# Patient Record
Sex: FEMALE | HISPANIC OR LATINO | Employment: UNEMPLOYED | URBAN - METROPOLITAN AREA
[De-identification: names, ages, dates, MRNs, and addresses within clinical notes are randomized per-mention and may not be internally consistent; named-entity substitution may affect disease eponyms.]

---

## 2023-04-06 LAB — EXTERNAL HEMOGLOBIN: 10.9 G/DL

## 2023-05-19 ENCOUNTER — TELEPHONE (OUTPATIENT)
Dept: OBSTETRICS AND GYNECOLOGY | Facility: CLINIC | Age: 23
End: 2023-05-19
Payer: COMMERCIAL

## 2023-05-19 NOTE — TELEPHONE ENCOUNTER
New ob pt called to make an appointment. 32 wks transferring from [place]. She is going to fax her medical records so we can get her scheduled.

## 2023-05-22 NOTE — TELEPHONE ENCOUNTER
Pt brought physical copy of medical records from [place].  They are in your mailbox at [place] office.  Please review tomorrow at [place] and advise if you are able to accept pt's care.     Thanks,   Vandana Pacheco # [phone]

## 2023-05-25 NOTE — PROGRESS NOTES
Initial OB Visit     Chief Complaint   Patient presents with   • Initial Prenatal Visit        Marilu Neely is being seen today for her first obstetrical visit with our practice. She transferred, and records are in media.  She is a 23 y.o.  32w6d by ultrasound at outside office.    This is not a planned pregnancy. She feels safe where she lives. The father is not involved.    OB History    Para Term  AB Living   2       1     SAB IAB Ectopic Molar Multiple Live Births                    # Outcome Date GA Lbr Gonzalez/2nd Weight Sex Delivery Anes PTL Lv   2 Current            1 AB 2017               Current obstetric complaints: Reflux- Tums   Prior obstetric issues, potential pregnancy concerns: N/A  Family history of genetic issues (includes FOB): denies  Prior infections concerning in pregnancy (Rash, fever since LMP): denies  Varicella Hx: immune by history  Prior genetic testing: negative aneuploidy testing  History of abnormal pap smears: denies  History of STIs: denies  History of HSV in self or partner? denies  Prepregnancy weight 168lbs    Prenatal records reviewed  Labs:   1 hour glucose challenge test: 91  Hemoglobin 10.9, platelets 340 on 2023  March 3, 2023: Antibody negative, gonorrhea and chlamydia negative, urine culture negative, AST 18, ALT 47  : RPR negative, rubella immune, HIV negative, hepatitis B negative, O+  March 10, 2023: Normal hemoglobinopathy, negative SMA negative Fragile X.  Maternity 23 also negative this pregnancy.  Anatomy within normal limits, breech presentation    Past Medical History:   Diagnosis Date   • Cataract    • Pabon-Herminio syndrome        Past Surgical History:   Procedure Laterality Date   • EYE SURGERY           Current Outpatient Medications:   •  prenatal vitamin (prenatal, CLASSIC, vitamin) tablet, Take  by mouth Daily., Disp: , Rfl:     No Known Allergies    Social History     Socioeconomic History   • Marital  "status: Single   Tobacco Use   • Smoking status: Never   Vaping Use   • Vaping Use: Never used   Substance and Sexual Activity   • Alcohol use: Never   • Drug use: Never       Family History   Problem Relation Age of Onset   • No Known Problems Mother    • No Known Problems Father        Review of systems   See HPI         Objective    BP 97/64   Ht 160 cm (62.99\")   Wt 83.5 kg (184 lb)   LMP 10/08/2022   BMI 32.60 kg/m²       General Appearance:    Alert, cooperative, in no acute distress, habitus normal   Head:    Normocephalic, without obvious abnormality, atraumatic   Eyes:            Lids and lashes normal, conjunctivae and sclerae normal, no   icterus, no pallor, corneas clear   Ears:    Ears appear intact with no abnormalities noted       Neck:   No adenopathy, supple, trachea midline, no thyromegaly   Back:     No kyphosis present, no scoliosis present,                       Lungs:     Clear to auscultation,respirations regular, even and                   unlabored    Heart:    Regular rhythm and normal rate, normal S1 and S2, no            murmur, no gallop, no rub, no click   Breast Exam:    No masses, No nipple discharge   Abdomen:     Normal bowel sounds, no masses, no organomegaly, soft        non-tender, non-distended, no guarding, no rebound                 tenderness   Genitalia:    Vulva - BUS-WNL, NEFG    Vagina - No discharge, No bleeding    Cervix - No Lesions, closed     Uterus - Consistent with 32 weeks    Adnexa - No masses, NT    Pelvimetry - clinically adequate, gynecoid pelvis     Extremities:   Moves all extremities well, no edema, no cyanosis, no              redness   Pulses:   Pulses palpable and equal bilaterally   Skin:   No bleeding, bruising or rash   Lymph nodes:   No palpable adenopathy   Neurologic:   Sensation intact, A&O times 3      Assessment  Pregnancy at 32w6d    Diagnosis Plan   1. Supervision of normal first pregnancy, antepartum  US Ob Follow Up Transabdominal " Approach      2. Maternal care for breech presentation, single gestation        3. Elevated liver enzymes, resolved        4. Pabon-Herminio syndrome      Needs Anesthesia consult           Plan    Initial labs reviewed - see above  Patient is on Prenatal vitamins  Problem list reviewed and updated.  Counseled on limitations of ultrasound in pregnancy in detecting aneuploidy/fetal anomalies  She needs an anesthesia consultation.  Reviewed with patient the location of labor and delivery and how to go about receiving a consultation  Reports normal glucose testing at last office  All questions answered.   Return in about 2 weeks (around 6/9/2023) for growth US, OB visit.      Claire Chong MD

## 2023-05-26 ENCOUNTER — INITIAL PRENATAL (OUTPATIENT)
Dept: OBSTETRICS AND GYNECOLOGY | Facility: CLINIC | Age: 23
End: 2023-05-26
Payer: COMMERCIAL

## 2023-05-26 VITALS
WEIGHT: 184 LBS | BODY MASS INDEX: 32.6 KG/M2 | DIASTOLIC BLOOD PRESSURE: 64 MMHG | HEIGHT: 63 IN | SYSTOLIC BLOOD PRESSURE: 97 MMHG

## 2023-05-26 DIAGNOSIS — R74.8 ELEVATED LIVER ENZYMES: ICD-10-CM

## 2023-05-26 DIAGNOSIS — Z34.00 SUPERVISION OF NORMAL FIRST PREGNANCY, ANTEPARTUM: Primary | ICD-10-CM

## 2023-05-26 DIAGNOSIS — Q87.0 FREEMAN-SHELDON SYNDROME: ICD-10-CM

## 2023-05-26 LAB
GLUCOSE UR STRIP-MCNC: NEGATIVE MG/DL
PROT UR STRIP-MCNC: ABNORMAL MG/DL

## 2023-05-26 RX ORDER — PRENATAL VIT NO.126/IRON/FOLIC 28MG-0.8MG
TABLET ORAL DAILY
COMMUNITY

## 2023-06-13 ENCOUNTER — ROUTINE PRENATAL (OUTPATIENT)
Dept: OBSTETRICS AND GYNECOLOGY | Facility: CLINIC | Age: 23
End: 2023-06-13
Payer: COMMERCIAL

## 2023-06-13 VITALS — DIASTOLIC BLOOD PRESSURE: 64 MMHG | BODY MASS INDEX: 33.67 KG/M2 | SYSTOLIC BLOOD PRESSURE: 120 MMHG | WEIGHT: 190 LBS

## 2023-06-13 DIAGNOSIS — Z3A.35 35 WEEKS GESTATION OF PREGNANCY: Primary | ICD-10-CM

## 2023-06-13 DIAGNOSIS — Q87.0 FREEMAN-SHELDON SYNDROME: ICD-10-CM

## 2023-06-13 DIAGNOSIS — Z98.890 H/O EYE SURGERY: ICD-10-CM

## 2023-06-13 LAB
GLUCOSE UR STRIP-MCNC: NEGATIVE MG/DL
PROT UR STRIP-MCNC: ABNORMAL MG/DL

## 2023-06-13 RX ORDER — PRENATAL VIT,CAL 76/IRON/FOLIC 29 MG-1 MG
1 TABLET ORAL EVERY MORNING
Status: ON HOLD | COMMUNITY
Start: 2023-02-28

## 2023-06-13 NOTE — PROGRESS NOTES
CC: RG     Marilucourt Neely is a 23 y.o.  at 35w3d  here for routine OB visit  Reports that she is concerned if she needs a CS for the risk of malignant hyperthermia. She has never had this before but she has Pabon-Herminio Syndrome. Reports she also had lenses replaced in [place] and is unsure if that will preclude her from a delivery type.    She denies ever being told she had to have a CS  She has not yet met with anesthesia    /64   Wt 86.2 kg (190 lb)   LMP 10/08/2022   BMI 33.67 kg/m²    Gen: NAD, well appearing  Abd: nontender  Pelvic: normal external genitalia    See OB Flowsheet    ASSESSMENT/PLAN:  (Z3A.35) 35 weeks gestation of pregnancy - Plan: POC Urinalysis Dipstick    (Q87.0) Pabon-Herminio syndrome    (Z98.890) H/O eye surgery    Reviewed with patient that I do not see an indication for a CS unless US or fetal tracing or maternal factors arise at this time, but did strongly encourage her to see Anesthesia. Provided map of hospital and location. She will go for consult  US within normal limits, BPP   Routine counseling pertinent to this stage of pregnancy was reviewed including labor precautions  Return in about 1 week (around 2023).

## 2023-06-15 LAB — GP B STREP DNA SPEC QL NAA+PROBE: NEGATIVE

## 2023-06-18 ENCOUNTER — ANESTHESIA (OUTPATIENT)
Dept: LABOR AND DELIVERY | Facility: HOSPITAL | Age: 23
End: 2023-06-18
Payer: COMMERCIAL

## 2023-06-18 ENCOUNTER — ANESTHESIA EVENT (OUTPATIENT)
Dept: LABOR AND DELIVERY | Facility: HOSPITAL | Age: 23
End: 2023-06-18
Payer: COMMERCIAL

## 2023-06-18 PROBLEM — Z98.891 S/P CESAREAN SECTION: Status: ACTIVE | Noted: 2023-06-18

## 2023-06-18 PROBLEM — Z34.90 PREGNANT: Status: ACTIVE | Noted: 2023-06-18

## 2023-06-18 PROCEDURE — 25010000002 AZITHROMYCIN PER 500 MG: Performed by: OBSTETRICS & GYNECOLOGY

## 2023-06-18 PROCEDURE — 25010000002 MORPHINE PER 10 MG: Performed by: ANESTHESIOLOGY

## 2023-06-18 PROCEDURE — 25010000002 KETOROLAC TROMETHAMINE PER 15 MG: Performed by: OBSTETRICS & GYNECOLOGY

## 2023-06-18 PROCEDURE — 25010000002 PHENYLEPHRINE 10 MG/ML SOLUTION: Performed by: NURSE ANESTHETIST, CERTIFIED REGISTERED

## 2023-06-18 RX ORDER — MORPHINE SULFATE 4 MG/ML
INJECTION, SOLUTION INTRAMUSCULAR; INTRAVENOUS
Status: COMPLETED | OUTPATIENT
Start: 2023-06-18 | End: 2023-06-18

## 2023-06-18 RX ORDER — PHENYLEPHRINE HYDROCHLORIDE 10 MG/ML
INJECTION INTRAVENOUS AS NEEDED
Status: DISCONTINUED | OUTPATIENT
Start: 2023-06-18 | End: 2023-06-19 | Stop reason: SURG

## 2023-06-18 RX ORDER — BUPIVACAINE HYDROCHLORIDE 7.5 MG/ML
INJECTION, SOLUTION EPIDURAL; RETROBULBAR
Status: COMPLETED | OUTPATIENT
Start: 2023-06-18 | End: 2023-06-18

## 2023-06-18 RX ADMIN — PHENYLEPHRINE HYDROCHLORIDE 100 MCG: 10 INJECTION INTRAVENOUS at 23:19

## 2023-06-18 RX ADMIN — PHENYLEPHRINE HYDROCHLORIDE 100 MCG: 10 INJECTION INTRAVENOUS at 23:30

## 2023-06-18 RX ADMIN — PHENYLEPHRINE HYDROCHLORIDE 100 MCG: 10 INJECTION INTRAVENOUS at 23:28

## 2023-06-18 RX ADMIN — MORPHINE SULFATE 200 MCG: 4 INJECTION, SOLUTION INTRAMUSCULAR; INTRAVENOUS at 23:04

## 2023-06-18 RX ADMIN — BUPIVACAINE HYDROCHLORIDE 1.6 ML: 7.5 INJECTION, SOLUTION EPIDURAL; RETROBULBAR at 23:04

## 2023-06-18 RX ADMIN — PHENYLEPHRINE HYDROCHLORIDE 100 MCG: 10 INJECTION INTRAVENOUS at 23:34

## 2023-06-18 RX ADMIN — SODIUM CHLORIDE, POTASSIUM CHLORIDE, SODIUM LACTATE AND CALCIUM CHLORIDE: 600; 310; 30; 20 INJECTION, SOLUTION INTRAVENOUS at 23:10

## 2023-06-18 RX ADMIN — PHENYLEPHRINE HYDROCHLORIDE 100 MCG: 10 INJECTION INTRAVENOUS at 23:11

## 2023-06-18 RX ADMIN — PHENYLEPHRINE HYDROCHLORIDE 100 MCG: 10 INJECTION INTRAVENOUS at 23:38

## 2023-06-18 RX ADMIN — Medication 999 ML/HR: at 23:19

## 2023-06-18 RX ADMIN — PHENYLEPHRINE HYDROCHLORIDE 100 MCG: 10 INJECTION INTRAVENOUS at 23:32

## 2023-06-18 RX ADMIN — PHENYLEPHRINE HYDROCHLORIDE 100 MCG: 10 INJECTION INTRAVENOUS at 23:05

## 2023-06-18 RX ADMIN — PHENYLEPHRINE HYDROCHLORIDE 100 MCG: 10 INJECTION INTRAVENOUS at 23:07

## 2023-06-18 RX ADMIN — PHENYLEPHRINE HYDROCHLORIDE 100 MCG: 10 INJECTION INTRAVENOUS at 23:21

## 2023-06-18 RX ADMIN — PHENYLEPHRINE HYDROCHLORIDE 100 MCG: 10 INJECTION INTRAVENOUS at 23:15

## 2023-06-18 RX ADMIN — AZITHROMYCIN MONOHYDRATE 500 MG: 500 INJECTION, POWDER, LYOPHILIZED, FOR SOLUTION INTRAVENOUS at 23:16

## 2023-06-18 RX ADMIN — PHENYLEPHRINE HYDROCHLORIDE 100 MCG: 10 INJECTION INTRAVENOUS at 23:23

## 2023-06-18 RX ADMIN — PHENYLEPHRINE HYDROCHLORIDE 100 MCG: 10 INJECTION INTRAVENOUS at 23:25

## 2023-06-18 RX ADMIN — PHENYLEPHRINE HYDROCHLORIDE 100 MCG: 10 INJECTION INTRAVENOUS at 23:36

## 2023-06-18 RX ADMIN — PHENYLEPHRINE HYDROCHLORIDE 100 MCG: 10 INJECTION INTRAVENOUS at 23:17

## 2023-06-18 RX ADMIN — PHENYLEPHRINE HYDROCHLORIDE 100 MCG: 10 INJECTION INTRAVENOUS at 23:13

## 2023-06-18 RX ADMIN — KETOROLAC TROMETHAMINE 30 MG: 30 INJECTION, SOLUTION INTRAMUSCULAR; INTRAVENOUS at 23:49

## 2023-06-18 RX ADMIN — PHENYLEPHRINE HYDROCHLORIDE 100 MCG: 10 INJECTION INTRAVENOUS at 23:09

## 2023-06-19 NOTE — ANESTHESIA PROCEDURE NOTES
Spinal Block      Patient reassessed immediately prior to procedure    Patient location during procedure: OR  Start Time: 6/18/2023 11:03 PM  Stop Time: 6/18/2023 11:04 PM  Indication:at surgeon's request  Performed By  Anesthesiologist: Aung Pastor MD  Preanesthetic Checklist  Completed: patient identified, IV checked, risks and benefits discussed, surgical consent, monitors and equipment checked, pre-op evaluation and timeout performed  Spinal Block Prep:  Patient Position:sitting  Sterile Tech:cap, gloves, mask and sterile barriers  Prep:Chloraprep  Patient Monitoring:blood pressure monitoring and continuous pulse oximetry    Spinal Block Procedure  Approach:midline  Guidance:landmark technique  Location:L4-L5  Needle Type:Rosa  Needle Gauge:25 G  Placement of Spinal needle event:cerebrospinal fluid aspirated    Fluid Appearance:clear  Medications: Morphine sulfate (PF) injection - Epidural   200 mcg - 6/18/2023 11:04:00 PM  bupivacaine PF (MARCAINE) 0.75 % injection - Spinal   1.6 mL - 6/18/2023 11:04:00 PM   Post Assessment  Patient Tolerance:patient tolerated the procedure well with no apparent complications  Complications no

## 2023-06-19 NOTE — ANESTHESIA POSTPROCEDURE EVALUATION
"Patient: Marilu Neely    Procedure Summary       Date: 23 [facility] / [facility]    Anesthesia Start:  Anesthesia Stop: 23    Procedure:  SECTION PRIMARY (Abdomen) Diagnosis: (fetal intolerance)    Surgeons: Andreia Gillis MD Provider: Aung Pastor MD    Anesthesia Type: Not recorded ASA Status: 3 - Emergent            Anesthesia Type: No value filed.    Vitals  Vitals Value Taken Time   /58 2343   Temp 37.2 °C (98.9 °F) 23   Pulse 100 23   Resp 16 23   SpO2 100 % 23           Post Anesthesia Care and Evaluation    Patient location during evaluation: bedside  Patient participation: complete - patient participated  Level of consciousness: awake  Pain management: adequate    Airway patency: patent  Anesthetic complications: No anesthetic complications    Cardiovascular status: acceptable  Respiratory status: acceptable  Hydration status: acceptable    Comments: */58 (BP Location: Left arm, Patient Position: Sitting)   Pulse 100   Temp 37.2 °C (98.9 °F) (Oral)   Resp 16   Ht 160 cm (62.99\")   Wt 83.6 kg (184 lb 6.4 oz)   LMP 10/08/2022   SpO2 100%   Breastfeeding Yes   BMI 32.67 kg/m²       "

## 2023-06-19 NOTE — ANESTHESIA PREPROCEDURE EVALUATION
Anesthesia Evaluation     History of anesthetic complications: high risk for MH due to  Pabon-Herminio syndrome.               Airway   Dental      Pulmonary    Cardiovascular         Neuro/Psych  GI/Hepatic/Renal/Endo      Musculoskeletal     Abdominal    Substance History      OB/GYN    (+) Pregnant (@36wks 1day)        Other        ROS/Med Hx Other:  Pabon-Herminio syndrome                     Anesthesia Plan    ASA 3 - emergent         CODE STATUS:    Level Of Support Discussed With: Patient  Code Status (Patient has no pulse and is not breathing): CPR (Attempt to Resuscitate)  Medical Interventions (Patient has pulse or is breathing): Full Support  Release to patient: Routine Release

## 2023-08-22 ENCOUNTER — POSTPARTUM VISIT (OUTPATIENT)
Dept: OBSTETRICS AND GYNECOLOGY | Facility: CLINIC | Age: 23
End: 2023-08-22
Payer: COMMERCIAL

## 2023-08-22 VITALS
DIASTOLIC BLOOD PRESSURE: 63 MMHG | BODY MASS INDEX: 32.99 KG/M2 | HEIGHT: 63 IN | SYSTOLIC BLOOD PRESSURE: 112 MMHG | WEIGHT: 186.2 LBS

## 2023-08-22 LAB
B-HCG UR QL: NEGATIVE
EXPIRATION DATE: NORMAL
INTERNAL NEGATIVE CONTROL: NEGATIVE
INTERNAL POSITIVE CONTROL: POSITIVE
Lab: NORMAL

## 2023-08-22 NOTE — PROGRESS NOTES
Chief Complaint   Patient presents with    Post-op     9 wk post op/postpartum visit.   Would like to discuss birth control         SUBJECTIVE:   Marilu Neely is a 23 y.o.  who presents s/p  Primary Low Transverse  Section on 23 for non reassuring fetal heart tones remote from delivery.  She was 36 weeks and had PPROM.  Reports that she still has some pain on the right side of her incision.  She has had intercourse since delivery; lmp was last week. She wants contraception but is unsure what. IC has been protected with a condom but she conceived while using pills and a condom  Bowel and bladder function have returned to normal  Mood changes - reports sadness and irritation. Was on a medication for this in the past but cannot recall what. Denies SI  Formula feeding    OB History    Para Term  AB Living   2 1   1 1 1   SAB IAB Ectopic Molar Multiple Live Births           0 1      # Outcome Date GA Lbr Gonzalez/2nd Weight Sex Delivery Anes PTL Lv   2  23 36w1d  2440 g (5 lb 6.1 oz) F CS-LTranv Spinal Y SIMEON      Birth Comments: panda or 1      Complications: Fetal Intolerance   1 AB                   Past Medical History:   Diagnosis Date    Cataract     Pabon-Herminio syndrome      Past Surgical History:   Procedure Laterality Date     SECTION N/A 2023    Procedure:  SECTION PRIMARY;  Surgeon: Andreia Gillis MD;  Location: [facility] DELIV[facility];  Service: Obstetrics/Gynecology;  Laterality: N/A;    EYE SURGERY       Social History     Tobacco Use    Smoking status: Never    Smokeless tobacco: Never   Vaping Use    Vaping Use: Never used   Substance Use Topics    Alcohol use: Not Currently    Drug use: Never     Family History   Problem Relation Age of Onset    No Known Problems Mother     No Known Problems Father        Patient Active Problem List   Diagnosis    Pregnant    S/P  section        OBJECTIVE:   /63   Ht 160 cm  "(62.99\")   Wt 84.5 kg (186 lb 3.2 oz)   LMP 2023 (Exact Date)   Breastfeeding No   BMI 32.99 kg/mý    Physical Examination:   General appearance - alert, well appearing, and in no distress  Breasts - normal, no masses bilaterally, no nipple retraction,   Chest - no tachypnea, retractions or cyanosis  Heart - normal rate and regular rhythm  Abdomen - soft, nontender, nondistended, no masses or organomegaly; Incision well healed aside from a pinpoint area on right side that has some sero sanguinous drainage  Pelvic - normal external genitalia, vulva, vagina, cervix, uterus and adnexa, + dark bleeding  Neurological - screening mental status exam normal  Musculoskeletal - no joint tenderness, deformity or swelling  Psychiatric - nl mood and affect    Lab Results   Component Value Date    WBC 15.44 (H) 2023    WBC 15.12 (H) 2023    HGB 8.4 (L) 2023    HGB 8.5 (L) 2023    HCT 25.4 (L) 2023    HCT 25.7 (L) 2023    MCV 87.6 2023    MCV 87.7 2023     2023     2023        ASSESSMENT:   No diagnosis found.      PLAN:   Doing well postpartum  Baby girl doing well- reviewed risks of PPROM,  labor and consequences in next pregnancy.   Contraception counseled on options, leaning towards LARC- possibly Nexplanon but will call us prior to next appt to confirm  Reviewed wound care. Call with purulent drainage or worsening drainage.  At this time, may resume normal activity including intercourse and lifting.  Reviewed normal precautions.  Pap smear completed today.    Recommended follow up for annual exam or sooner with problems      "

## 2023-08-31 LAB
CONV .: ABNORMAL
CYTOLOGIST CVX/VAG CYTO: ABNORMAL
CYTOLOGY CVX/VAG DOC CYTO: ABNORMAL
CYTOLOGY CVX/VAG DOC THIN PREP: ABNORMAL
DX ICD CODE: ABNORMAL
DX ICD CODE: ABNORMAL
HIV 1 & 2 AB SER-IMP: ABNORMAL
HPV I/H RISK 4 DNA CVX QL PROBE+SIG AMP: POSITIVE
Lab: ABNORMAL
OTHER STN SPEC: ABNORMAL
PATHOLOGIST CVX/VAG CYTO: ABNORMAL
RECOM F/U CVX/VAG CYTO: ABNORMAL
STAT OF ADQ CVX/VAG CYTO-IMP: ABNORMAL

## 2023-08-31 NOTE — PROGRESS NOTES
Dr. Castillo Sharp patient -postpartum - initial pap is ASCUS HPV +.  In her young age, we just have them follow up in one year to repeat the pap and ensure it does not worsen or become more of a problem. Please reach out to her and explain this along with letting Dr. Chong's MA know to put her on for recall in one year. Thanks, Dr. Charles

## 2023-09-05 ENCOUNTER — TELEPHONE (OUTPATIENT)
Dept: OBSTETRICS AND GYNECOLOGY | Facility: CLINIC | Age: 23
End: 2023-09-05
Payer: COMMERCIAL

## 2023-09-05 NOTE — TELEPHONE ENCOUNTER
----- Message from [place] MD sent at 8/31/2023  6:28 PM EDT -----  Dr. Castillo Sharp patient -postpartum - initial pap is ASCUS HPV +.  In her young age, we just have them follow up in one year to repeat the pap and ensure it does not worsen or become more of a problem. Please reach out to her and explain this along with letting Dr. Chong's MA know to put her on for recall in one year. Thanks, Dr. Charles

## 2023-09-06 ENCOUNTER — TELEPHONE (OUTPATIENT)
Dept: OBSTETRICS AND GYNECOLOGY | Facility: CLINIC | Age: 23
End: 2023-09-06
Payer: COMMERCIAL

## 2023-09-06 NOTE — TELEPHONE ENCOUNTER
Left message for Marilu of + ASCUS HPV and in your young age will f/u in 1 year with a repeat pap smear. It can self heal. Thank you

## 2023-09-07 ENCOUNTER — TELEPHONE (OUTPATIENT)
Dept: OBSTETRICS AND GYNECOLOGY | Facility: CLINIC | Age: 23
End: 2023-09-07
Payer: COMMERCIAL

## 2023-12-26 ENCOUNTER — TELEPHONE (OUTPATIENT)
Dept: OBSTETRICS AND GYNECOLOGY | Facility: CLINIC | Age: 23
End: 2023-12-26
Payer: COMMERCIAL

## 2023-12-26 NOTE — TELEPHONE ENCOUNTER
Looks like at her postpartum they discussed the Nexplanon which is an implant in your arm. Mirena and Kyleena IUD are hormonal birth control that is inserted vaginally. Paragard is a copper nonhormonal IUD inserted vaginally.

## 2023-12-26 NOTE — TELEPHONE ENCOUNTER
She has passport so she is buy and bill for a device. I just need to know what kind she's wanting (Petra, Ricky, Kyleena) and it can be scheduled 2 weeks out with no intercourse or if she's on her period. She's had a csection so it will need to be US guided also.

## 2023-12-26 NOTE — TELEPHONE ENCOUNTER
Pt states she does not mind any kind of device. I scheduled her for 01/30 together with an US. I have advised no intercourse for 2 weeks before the appointment.    Does she need to choose a device herself. If so how can I describe the differences of each?    Please advise  ~Erica

## 2023-12-26 NOTE — TELEPHONE ENCOUNTER
Pt came into the office wanting an appointment for IUD insertion. Pt missed rober back in September. Wants to know if she needs to see the  for a consult or can the device be reordered. Pt will have same insurance starting January.      Please advise  ~Erica

## 2024-02-07 ENCOUNTER — TELEPHONE (OUTPATIENT)
Dept: OBSTETRICS AND GYNECOLOGY | Facility: CLINIC | Age: 24
End: 2024-02-07
Payer: COMMERCIAL

## 2025-03-11 ENCOUNTER — INITIAL PRENATAL (OUTPATIENT)
Dept: OBSTETRICS AND GYNECOLOGY | Facility: CLINIC | Age: 25
End: 2025-03-11
Payer: COMMERCIAL

## 2025-03-11 VITALS — SYSTOLIC BLOOD PRESSURE: 110 MMHG | BODY MASS INDEX: 36.5 KG/M2 | WEIGHT: 206 LBS | DIASTOLIC BLOOD PRESSURE: 82 MMHG

## 2025-03-11 DIAGNOSIS — R87.810 ASCUS WITH POSITIVE HIGH RISK HPV CERVICAL: ICD-10-CM

## 2025-03-11 DIAGNOSIS — Z87.59 HISTORY OF PRETERM PREMATURE RUPTURE OF MEMBRANES (PPROM): ICD-10-CM

## 2025-03-11 DIAGNOSIS — O21.9 NAUSEA AND VOMITING IN PREGNANCY: ICD-10-CM

## 2025-03-11 DIAGNOSIS — Z98.891 S/P CESAREAN SECTION: Primary | ICD-10-CM

## 2025-03-11 DIAGNOSIS — Q87.0 FREEMAN-SHELDON SYNDROME: ICD-10-CM

## 2025-03-11 DIAGNOSIS — R87.610 ASCUS WITH POSITIVE HIGH RISK HPV CERVICAL: ICD-10-CM

## 2025-03-11 DIAGNOSIS — R74.8 ABNORMAL LIVER ENZYMES: ICD-10-CM

## 2025-03-11 RX ORDER — ONDANSETRON 4 MG/1
4 TABLET, ORALLY DISINTEGRATING ORAL EVERY 8 HOURS PRN
Qty: 30 TABLET | Refills: 0 | Status: SHIPPED | OUTPATIENT
Start: 2025-03-11

## 2025-03-11 RX ORDER — DOXYLAMINE SUCCINATE AND PYRIDOXINE HYDROCHLORIDE 20; 20 MG/1; MG/1
1 TABLET, EXTENDED RELEASE ORAL
Qty: 60 TABLET | Refills: 1 | Status: SHIPPED | OUTPATIENT
Start: 2025-03-11

## 2025-03-11 NOTE — PROGRESS NOTES
This intake was provided by  [facility] ID#[id number] OB Visit     Chief Complaint   Patient presents with    Initial Prenatal Visit        Marilu Neely is being seen today for her first obstetrical visit She is a 24 y.o.  at 10w1d  by ultrasound today  This is not a planned pregnancy. She feels safe where she lives. The father is supportive.    OB History    Para Term  AB Living   3 1  1 1 1   SAB IAB Ectopic Molar Multiple Live Births       0 1      # Outcome Date GA Lbr Gonzalez/2nd Weight Sex Type Anes PTL Lv   3 Current            2  23 36w1d  2440 g (5 lb 6.1 oz) F CS-LTranv Spinal Y SIMEON      Birth Comments: panda or 1      Complications: Fetal Intolerance   1 2017               Current obstetric complaints: nausea, some vomiting  Prior obstetric issues, potential pregnancy concerns: H/o PPROM with 36w CS for non reassuring fetal status  Family history of genetic issues (includes FOB): denies  Prior infections concerning in pregnancy (Rash, fever since LMP): denies  Varicella Hx: immune by history  History of abnormal pap smears: ASCUS HPV pos in , due for repeat  History of STIs: denies  History of HSV in self or partner? denies      Past Medical History:   Diagnosis Date    Abnormal Pap smear of cervix 2023    ASCUS (+)    Cataract     Pabon-Herminio syndrome     HPV (human papilloma virus) infection 2023       Past Surgical History:   Procedure Laterality Date    APPENDECTOMY       SECTION N/A 2023    Procedure:  SECTION PRIMARY;  Surgeon: Andreia Gillis MD;  Location: [facility] LABOR DELIVERY;  Service: Obstetrics/Gynecology;  Laterality: N/A;    EYE SURGERY           Current Outpatient Medications:     doxylamine-pyridoxine ER (Bonjesta) 20-20 MG tablet controlled-release tablet, Take 1 tablet by mouth Every Morning Before Breakfast. Increase to one tablet QAM and one tablet QPM if needed, Disp: 60 tablet,  Rfl: 1    ondansetron ODT (ZOFRAN-ODT) 4 MG disintegrating tablet, Take 1 tablet by mouth Every 8 (Eight) Hours As Needed for Nausea or Vomiting., Disp: 30 tablet, Rfl: 0    sertraline (Zoloft) 50 MG tablet, Take 1 tablet by mouth Daily., Disp: 30 tablet, Rfl: 2    No Known Allergies    Social History     Socioeconomic History    Marital status: Single   Tobacco Use    Smoking status: Never    Smokeless tobacco: Never   Vaping Use    Vaping status: Never Used   Substance and Sexual Activity    Alcohol use: Not Currently    Drug use: Never    Sexual activity: Yes     Partners: Male     Birth control/protection: None       Family History   Problem Relation Age of Onset    No Known Problems Mother     No Known Problems Father        Review of systems   See HPI         Objective    /82   Wt 93.4 kg (206 lb)   LMP  (LMP Unknown)   BMI 36.50 kg/m²       General Appearance:    Alert, cooperative, in no acute distress, habitus normal   Head:    Normocephalic, without obvious abnormality, atraumatic   Eyes:            Lids and lashes normal, conjunctivae and sclerae normal, no   icterus, no pallor, corneas clear   Ears:    Ears appear intact with no abnormalities noted       Neck:   No adenopathy, supple, trachea midline, no thyromegaly   Back:     No kyphosis present, no scoliosis present,                       Lungs:     Clear to auscultation,respirations regular, even and                   unlabored    Heart:    Regular rhythm and normal rate, normal S1 and S2, no            murmur, no gallop, no rub, no click   Breast Exam:    No masses, No nipple discharge   Abdomen:     Normal bowel sounds, no masses, no organomegaly, soft        non-tender, non-distended, no guarding, no rebound                 tenderness   Genitalia:    Vulva - BUS-WNL, NEFG    Vagina - No discharge, No bleeding    Cervix - No Lesions, closed     Uterus - Consistent with 32 weeks    Adnexa - No masses, NT    Pelvimetry - clinically adequate,  gynecoid pelvis     Extremities:   Moves all extremities well, no edema, no cyanosis, no              redness   Pulses:   Pulses palpable and equal bilaterally   Skin:   No bleeding, bruising or rash   Lymph nodes:   No palpable adenopathy   Neurologic:   Sensation intact, A&O times 3      Assessment  Pregnancy at 32w6d    Diagnosis Plan   1. S/P  section  OB Panel With HIV and RPR    NuSwab VG+ - Swab, Vagina    Urine Culture - Urine, Urine, Clean Catch    Comprehensive Metabolic Panel    [id number] PLUS Core+SCA - Blood,    IGP, Apt HPV,rfx 16 / 18,45      2. Pabon-Herminio syndrome        3. History of  premature rupture of membranes (PPROM)        4. Abnormal liver enzymes        5. ASCUS with positive high risk HPV cervical        6. Nausea and vomiting in pregnancy  Comprehensive Metabolic Panel           Plan  Aware of nature of practice  Rx sent for nausea  She is interested in cell free DNA. Aware of limitations of testing, possibility of need for additional testing (such as amniocentesis), possibility of out of pocket expense, possibility of false positive/false negative results.     Reviewed dating US  Repeat pap smear reviewed with patient  Desires repeat CS  Reviewed first trimester bleeding/pain precautions, and indications for sooner follow up.     Return in about 4 weeks (around 2025) for OB visit, 6 weeks OB visit with cervical length.      Claire Chong MD

## 2025-03-12 LAB
ABO GROUP BLD: ABNORMAL
ALBUMIN SERPL-MCNC: 4.1 G/DL (ref 4–5)
ALP SERPL-CCNC: 78 IU/L (ref 44–121)
ALT SERPL-CCNC: 20 IU/L (ref 0–32)
AST SERPL-CCNC: 15 IU/L (ref 0–40)
BASOPHILS # BLD AUTO: 0.1 X10E3/UL (ref 0–0.2)
BASOPHILS NFR BLD AUTO: 0 %
BILIRUB SERPL-MCNC: 0.3 MG/DL (ref 0–1.2)
BLD GP AB SCN SERPL QL: NEGATIVE
BUN SERPL-MCNC: 10 MG/DL (ref 6–20)
BUN/CREAT SERPL: 20 (ref 9–23)
CALCIUM SERPL-MCNC: 9.6 MG/DL (ref 8.7–10.2)
CHLORIDE SERPL-SCNC: 98 MMOL/L (ref 96–106)
CO2 SERPL-SCNC: 19 MMOL/L (ref 20–29)
CREAT SERPL-MCNC: 0.51 MG/DL (ref 0.57–1)
EGFRCR SERPLBLD CKD-EPI 2021: 135 ML/MIN/1.73
EOSINOPHIL # BLD AUTO: 0.1 X10E3/UL (ref 0–0.4)
EOSINOPHIL NFR BLD AUTO: 1 %
ERYTHROCYTE [DISTWIDTH] IN BLOOD BY AUTOMATED COUNT: 12.2 % (ref 11.7–15.4)
GLOBULIN SER CALC-MCNC: 3.3 G/DL (ref 1.5–4.5)
GLUCOSE SERPL-MCNC: 81 MG/DL (ref 70–99)
HBV SURFACE AG SERPL QL IA: NEGATIVE
HCT VFR BLD AUTO: 37.6 % (ref 34–46.6)
HCV AB SERPL QL IA: NON REACTIVE
HCV AB SERPL QL IA: NORMAL
HGB BLD-MCNC: 12.3 G/DL (ref 11.1–15.9)
HIV 1+2 AB+HIV1 P24 AG SERPL QL IA: NON REACTIVE
IMM GRANULOCYTES # BLD AUTO: 0.1 X10E3/UL (ref 0–0.1)
IMM GRANULOCYTES NFR BLD AUTO: 1 %
LYMPHOCYTES # BLD AUTO: 2.1 X10E3/UL (ref 0.7–3.1)
LYMPHOCYTES NFR BLD AUTO: 14 %
MCH RBC QN AUTO: 28.7 PG (ref 26.6–33)
MCHC RBC AUTO-ENTMCNC: 32.7 G/DL (ref 31.5–35.7)
MCV RBC AUTO: 88 FL (ref 79–97)
MONOCYTES # BLD AUTO: 1.1 X10E3/UL (ref 0.1–0.9)
MONOCYTES NFR BLD AUTO: 7 %
NEUTROPHILS # BLD AUTO: 12.2 X10E3/UL (ref 1.4–7)
NEUTROPHILS NFR BLD AUTO: 77 %
PLATELET # BLD AUTO: 463 X10E3/UL (ref 150–450)
POTASSIUM SERPL-SCNC: 4.1 MMOL/L (ref 3.5–5.2)
PROT SERPL-MCNC: 7.4 G/DL (ref 6–8.5)
RBC # BLD AUTO: 4.28 X10E6/UL (ref 3.77–5.28)
RH BLD: POSITIVE
RPR SER QL: NON REACTIVE
RUBV IGG SERPL IA-ACNC: 2.59 INDEX
SODIUM SERPL-SCNC: 134 MMOL/L (ref 134–144)
WBC # BLD AUTO: 15.6 X10E3/UL (ref 3.4–10.8)

## 2025-03-13 ENCOUNTER — RESULTS FOLLOW-UP (OUTPATIENT)
Dept: OBSTETRICS AND GYNECOLOGY | Facility: CLINIC | Age: 25
End: 2025-03-13
Payer: COMMERCIAL

## 2025-03-13 LAB
A VAGINAE DNA VAG QL NAA+PROBE: ABNORMAL SCORE
BACTERIA UR CULT: NORMAL
BACTERIA UR CULT: NORMAL
BVAB2 DNA VAG QL NAA+PROBE: ABNORMAL SCORE
C ALBICANS DNA VAG QL NAA+PROBE: NEGATIVE
C GLABRATA DNA VAG QL NAA+PROBE: NEGATIVE
C TRACH DNA SPEC QL NAA+PROBE: NEGATIVE
MEGA1 DNA VAG QL NAA+PROBE: ABNORMAL SCORE
N GONORRHOEA DNA VAG QL NAA+PROBE: NEGATIVE
T VAGINALIS DNA VAG QL NAA+PROBE: NEGATIVE

## 2025-03-13 RX ORDER — METRONIDAZOLE 500 MG/1
500 TABLET ORAL 2 TIMES DAILY
Qty: 14 TABLET | Refills: 0 | Status: SHIPPED | OUTPATIENT
Start: 2025-03-13 | End: 2025-03-20

## 2025-03-13 NOTE — TELEPHONE ENCOUNTER
Please let patient know that her testing came back positive for BV.  Rx sent for flagyl to pharmacy.

## 2025-03-15 LAB
CFDNA.FET/CFDNA.TOTAL SFR FETUS: NORMAL %
CITATION REF LAB TEST: NORMAL
FET 13+18+21+X+Y ANEUP PLAS.CFDNA: NEGATIVE
FET CHR 21 TS PLAS.CFDNA QL: NEGATIVE
FET MS X RISK WBC.DNA+CFDNA QL: NOT DETECTED
FET SEX PLAS.CFDNA DOSAGE CFDNA: NORMAL
FET TS 13 RISK PLAS.CFDNA QL: NEGATIVE
FET TS 18 RISK WBC.DNA+CFDNA QL: NEGATIVE
FET X + Y ANEUP RISK PLAS.CFDNA SEQ-IMP: NOT DETECTED
GA EST FROM CONCEPTION DATE: NORMAL D
GESTATIONAL AGE > 9:: YES
LAB DIRECTOR NAME PROVIDER: NORMAL
LAB DIRECTOR NAME PROVIDER: NORMAL
LABORATORY COMMENT REPORT: NORMAL
LIMITATIONS OF THE TEST: NORMAL
NEGATIVE PREDICTIVE VALUE: NORMAL
PERFORMANCE CHARACTERISTICS: NORMAL
POSITIVE PREDICTIVE VALUE: NORMAL
REF LAB TEST METHOD: NORMAL
SERVICE CMNT-IMP: NORMAL
TEST PERFORMANCE INFO SPEC: NORMAL

## 2025-03-17 LAB
CYTOLOGIST CVX/VAG CYTO: ABNORMAL
CYTOLOGY CVX/VAG DOC CYTO: ABNORMAL
CYTOLOGY CVX/VAG DOC THIN PREP: ABNORMAL
DX ICD CODE: ABNORMAL
DX ICD CODE: ABNORMAL
HPV I/H RISK 4 DNA CVX QL PROBE+SIG AMP: POSITIVE
HPV16 DNA CVX QL PROBE+SIG AMP: NEGATIVE
HPV18+45 E6+E7 MRNA CVX QL NAA+PROBE: NEGATIVE
OTHER STN SPEC: ABNORMAL
PATHOLOGIST CVX/VAG CYTO: ABNORMAL
SERVICE CMNT-IMP: ABNORMAL
STAT OF ADQ CVX/VAG CYTO-IMP: ABNORMAL

## 2025-03-18 NOTE — TELEPHONE ENCOUNTER
Patient with LSIL, HPV positive pap smear. Recommend colposcopy as part of next appointment. Thanks!

## 2025-03-31 NOTE — TELEPHONE ENCOUNTER
Attempted to call pt about results, male answered and said that it must be an old number, he has had this number for several months. Letter mailed.[email or web address]   Conversation had with patient regarding results of labwork and imaging. Patient agrees with plan for discharge at this time. Patient agrees to comply with follow up with PCP. Return to ED precautions and discharge instructions given to patient.      follow up with your primary care doctor.

## 2025-04-07 ENCOUNTER — TELEPHONE (OUTPATIENT)
Dept: OBSTETRICS AND GYNECOLOGY | Facility: CLINIC | Age: 25
End: 2025-04-07
Payer: COMMERCIAL

## 2025-04-07 NOTE — TELEPHONE ENCOUNTER
Med refill. Castillo pt 14 wks. OB 4/8/25. [facility]ing [facility] PNV [email or web address]. Also would like to discuss labs done 3/11/25. Have not been reviewed. Thank you

## 2025-04-07 NOTE — TELEPHONE ENCOUNTER
Provider: DR CANTU    Caller: KARLA MENDOZA    [facility]     Phone Number: [phone] / LVM    Reason for Call: PT WOULD LIKE TO KNOW IF PRENATAL VITAMINS AND AN IRON PILL CAN BE CALLED INTO HER PHARMACY     PT ALSO WOULD LIKE A CALL BACK TO DISCUSS LAB RESULTS

## 2025-04-08 ENCOUNTER — ROUTINE PRENATAL (OUTPATIENT)
Dept: OBSTETRICS AND GYNECOLOGY | Facility: CLINIC | Age: 25
End: 2025-04-08
Payer: COMMERCIAL

## 2025-04-08 VITALS — DIASTOLIC BLOOD PRESSURE: 68 MMHG | BODY MASS INDEX: 35.44 KG/M2 | WEIGHT: 200 LBS | SYSTOLIC BLOOD PRESSURE: 117 MMHG

## 2025-04-08 DIAGNOSIS — B96.89 BV (BACTERIAL VAGINOSIS): ICD-10-CM

## 2025-04-08 DIAGNOSIS — Z98.891 HISTORY OF CESAREAN DELIVERY: ICD-10-CM

## 2025-04-08 DIAGNOSIS — N30.00 ACUTE CYSTITIS WITHOUT HEMATURIA: ICD-10-CM

## 2025-04-08 DIAGNOSIS — Z87.59 HISTORY OF PRETERM PREMATURE RUPTURE OF MEMBRANES (PPROM): ICD-10-CM

## 2025-04-08 DIAGNOSIS — N76.0 BV (BACTERIAL VAGINOSIS): ICD-10-CM

## 2025-04-08 DIAGNOSIS — R87.612 LGSIL ON PAP SMEAR OF CERVIX: ICD-10-CM

## 2025-04-08 DIAGNOSIS — Z3A.14 14 WEEKS GESTATION OF PREGNANCY: Primary | ICD-10-CM

## 2025-04-08 LAB
BILIRUB BLD-MCNC: ABNORMAL MG/DL
GLUCOSE UR STRIP-MCNC: NEGATIVE MG/DL
KETONES UR QL: ABNORMAL
LEUKOCYTE EST, POC: ABNORMAL
NITRITE UR-MCNC: NEGATIVE MG/ML
PH UR: 6.5 [PH] (ref 5–8)
PROT UR STRIP-MCNC: ABNORMAL MG/DL
RBC # UR STRIP: ABNORMAL /UL
SP GR UR: 1.02 (ref 1–1.03)
UROBILINOGEN UR QL: ABNORMAL

## 2025-04-08 RX ORDER — DOXYLAMINE SUCCINATE AND PYRIDOXINE HYDROCHLORIDE 20; 20 MG/1; MG/1
1 TABLET, EXTENDED RELEASE ORAL
Qty: 60 TABLET | Refills: 1 | Status: SHIPPED | OUTPATIENT
Start: 2025-04-08

## 2025-04-08 RX ORDER — ONDANSETRON 4 MG/1
4 TABLET, ORALLY DISINTEGRATING ORAL EVERY 8 HOURS PRN
Qty: 30 TABLET | Refills: 0 | Status: SHIPPED | OUTPATIENT
Start: 2025-04-08

## 2025-04-08 RX ORDER — PNV NO.95/FERROUS FUM/FOLIC AC 28MG-0.8MG
1 TABLET ORAL DAILY
Qty: 90 TABLET | Refills: 4 | Status: SHIPPED | OUTPATIENT
Start: 2025-04-08

## 2025-04-08 RX ORDER — METRONIDAZOLE 7.5 MG/G
1 GEL VAGINAL NIGHTLY
Qty: 70 G | Refills: 0 | Status: SHIPPED | OUTPATIENT
Start: 2025-04-08

## 2025-04-08 NOTE — PROGRESS NOTES
Patient or patient representative verbalized consent for the use of Ambient Listening during the visit with  Claire Chong MD for chart documentation. 2025  14:35 EDT    History of Present Illness  The patient is a 25-year-old -1-1-1 at 14 weeks and 1 day, presenting for a routine OB visit.    Low-grade squamous intraepithelial lesion (LSIL)  - A Pap smear and HPV testing were positive, but negative for types 16, 18, and 45  - She will need a colposcopy at the next appointment in four weeks    Bacterial vaginosis  - A prescription was sent  - There were difficulties contacting her due to a disconnected or incorrect phone number    Urinary tract infection  - She visited the emergency room yesterday  - She was prescribed antibiotics but has not yet picked them up  - She has not experienced fever    Back pain  - Significant back pain started six days ago  - Worsens with movement  - She denies any fever or vomiting    Supplemental information: None      Vitals:    25 1352   BP: 117/68       Physical Exam  Constitutional:       General: She is not in acute distress.     Appearance: Normal appearance. She is normal weight.   Abdominal:      General: Abdomen is flat.      Palpations: Abdomen is soft.      Tenderness: There is no right CVA tenderness or left CVA tenderness.   Neurological:      Mental Status: She is alert.           Results  - HPV testing:    - Positive    - Negative for types 16, 18, and 45     Diagnosis Plan   1. 14 weeks gestation of pregnancy  POC Urinalysis Dipstick      2. LGSIL on Pap smear of cervix        3. BV (bacterial vaginosis)        4. History of  delivery        5. History of  premature rupture of membranes (PPROM)  US Ob Transvaginal      6. Acute cystitis without hematuria  Urinalysis With Microscopic - Urine, Clean Catch    Urine Culture - Urine, Urine, Clean Catch          Assessment & Plan  1. Low-grade squamous intraepithelial lesion (LSIL): Pap smear  and HPV testing positive, negative for 16, 18, and 45.  - Colposcopy scheduled for the next appointment in four weeks to further evaluate the lesion  - Discussed the importance of follow-up and the potential need for further testing or treatment based on colposcopy findings    2. Bacterial vaginosis: Acute.  - Prescription for antibiotics sent but not yet picked up  - Complete the antibiotic course as prescribed    3. Urinary tract infection: Acute.  - Visited the emergency room and was prescribed antibiotics  - Complete the antibiotic course  - Monitor for symptoms such as fever, worsening pain, or vomiting  - Return to the hospital if symptoms worsen    4. Back pain: Acute.  - Significant back pain starting six days ago, worsened by movement. No CVA tenderness, more generalized in lower back  - Monitor symptoms  - Return if pain persists or worsens    5. Reviewed results from last visit including low risk NIPT    Follow-up  - Follow up in four weeks for a colposcopy and re-evaluation of bacterial vaginosis and urinary tract infection      Claire Chong MD  04/08/25 14:56 EDT     Return in about 4 weeks (around 5/6/2025) for US for cervical length, colposcopy with OB visit.

## 2025-04-08 NOTE — TELEPHONE ENCOUNTER
Lila- For the [id number] results the aneuploidy testing was negative and if she wishes to know sex, testing shows fetal sex is male.  We have been trying to reach her.  I sent a prescription for Flagyl to her pharmacy bc testing showed BV. She also had an LSIL pap smear and we recommend colposcopy with her next visit.  With the new Epic update you have to click the link to the result encounter.  Looks like Rachelle could not reach her by phone and mailed a letter.  3/13/2025 Results Follow-Up       PNV Rx sent. Her hemoglobin was normal at 12.3.  additional Iron can cause constipation and nausea, so I would  not start additional Iron at this time.

## 2025-04-09 LAB
APPEARANCE UR: ABNORMAL
BACTERIA #/AREA URNS HPF: ABNORMAL /HPF
BILIRUB UR QL STRIP: ABNORMAL
CASTS URNS MICRO: ABNORMAL
COLOR UR: ABNORMAL
EPI CELLS #/AREA URNS HPF: ABNORMAL /HPF
GLUCOSE UR QL STRIP: NEGATIVE
HGB UR QL STRIP: ABNORMAL
KETONES UR QL STRIP: ABNORMAL
LEUKOCYTE ESTERASE UR QL STRIP: ABNORMAL
NITRITE UR QL STRIP: POSITIVE
PH UR STRIP: 7.5 [PH] (ref 5–8)
PROT UR QL STRIP: ABNORMAL
RBC #/AREA URNS HPF: ABNORMAL /HPF
SP GR UR STRIP: 1.02 (ref 1–1.03)
UROBILINOGEN UR STRIP-MCNC: ABNORMAL MG/DL
WBC #/AREA URNS HPF: ABNORMAL /HPF

## 2025-04-10 ENCOUNTER — RESULTS FOLLOW-UP (OUTPATIENT)
Dept: OBSTETRICS AND GYNECOLOGY | Facility: CLINIC | Age: 25
End: 2025-04-10
Payer: COMMERCIAL

## 2025-04-10 LAB
BACTERIA UR CULT: NO GROWTH
BACTERIA UR CULT: NORMAL

## 2025-04-10 NOTE — TELEPHONE ENCOUNTER
Please let patient know there was no bacteria that grew from her urine, but there were some other signs of infection.  I would recommend completing the course of antibiotics she is on, but if the symptoms are not improved after finishing this to let us know as we may need to do additional testing like repeat the tests and/or get an ultrasound of her kidneys to look for kidney stones

## 2025-04-15 NOTE — TELEPHONE ENCOUNTER
Left voicemail that [id number] was negative and will need to call office for gender. Also Dr Chong sent in flagyl to pharmacy for bacterial infection. Also need to schedule a colposcopy due to pap smear results. A letter was also mailed. Please call the office at [phone].

## 2025-04-22 ENCOUNTER — ROUTINE PRENATAL (OUTPATIENT)
Dept: OBSTETRICS AND GYNECOLOGY | Facility: CLINIC | Age: 25
End: 2025-04-22
Payer: COMMERCIAL

## 2025-04-22 VITALS — SYSTOLIC BLOOD PRESSURE: 131 MMHG | BODY MASS INDEX: 36.5 KG/M2 | DIASTOLIC BLOOD PRESSURE: 77 MMHG | WEIGHT: 206 LBS

## 2025-04-22 DIAGNOSIS — Z3A.16 16 WEEKS GESTATION OF PREGNANCY: Primary | ICD-10-CM

## 2025-04-22 DIAGNOSIS — Z98.891 S/P CESAREAN SECTION: ICD-10-CM

## 2025-04-22 DIAGNOSIS — R87.612 LGSIL ON PAP SMEAR OF CERVIX: ICD-10-CM

## 2025-04-22 DIAGNOSIS — N30.00 ACUTE CYSTITIS WITHOUT HEMATURIA: ICD-10-CM

## 2025-04-22 LAB
GLUCOSE UR STRIP-MCNC: NEGATIVE MG/DL
PROT UR STRIP-MCNC: ABNORMAL MG/DL

## 2025-04-22 RX ORDER — CEPHALEXIN 500 MG/1
1 CAPSULE ORAL EVERY 6 HOURS
COMMUNITY
Start: 2025-04-07

## 2025-04-22 NOTE — PROGRESS NOTES
Patient or patient representative verbalized consent for the use of Ambient Listening during the visit with  Claire Chong MD for chart documentation. 2025  13:49 EDT    History of Present Illness  The patient is a 25-year-old female, -1-1-1, presenting at 16 weeks and 1 day for a routine obstetric visit. Her pregnancy has been complicated by a low-grade Pap smear, bacterial vaginosis, and a urinary tract infection (UTI). Her last urine culture on  was normal.    Low-grade Pap smear  - A colposcopy was recommended, which is a pelvic exam using a microscope, and she agreed to have it done today.    Bacterial vaginosis  - She denies current symptoms of infection, including vaginal discharge.    Urinary tract infection (UTI)  - Her last urine culture on  was normal.    Ultrasound findings  - The ultrasound showed the cervix measuring 4.7 cm and the placenta is anterior.  - She has not felt fetal movements yet, which is normal given the anterior placenta.    Supplemental information: She reports improvement in nausea and has gained 6 pounds since her last visit on .      Vitals:    25 1313   BP: 131/77       Physical Exam  Gen:NAD  Colpo- see note    Results  - Imaging (Ultrasound):    - Anterior placenta     Diagnosis Plan   1. 16 weeks gestation of pregnancy  POC Urinalysis Dipstick      2. LGSIL on Pap smear of cervix        3. S/P  section        4. Acute cystitis without hematuria            Assessment & Plan  1. Routine obstetric visit: Stable.  - Pregnancy at 16 weeks and 1 day.  - Weight gain of 6 pounds since last visit.  - Nausea has improved.  - Continue routine prenatal care.    2. Bacterial vaginosis: Resolved.  - Denies current symptoms of infection.    3. Low-grade Pap smear: Normal.  - Colposcopy recommended and agreed to be done today.  - Procedure will be performed in the office.    Follow-up  - Follow up in 4 weeks for next routine obstetric  visit.    PROCEDURE  Colposcopy will be performed today.      Claire Chong MD  04/22/25 13:49 EDT     Return in about 4 weeks (around 5/20/2025) for anatomy  US, OB visit.    Colposcopy Procedure Note    Indications:   Pap 3/2025: LSIL, HPV positive    History:  Social History     Tobacco Use   Smoking Status Never   Smokeless Tobacco Never     16w1d     Procedure Details   The risks and benefits of the procedure and Written informed consent obtained.    Speculum placed in vagina and excellent visualization of cervix achieved, cervix swabbed x 3 with acetic acid solution.  360degree visualization of DESTINI adequate with qtip    Findings:  Cervix: no biopsies taken.  no visible lesions;   Vaginal inspection: vaginal colposcopy not performed.   Vulvar colposcopy: vulvar colposcopy not performed.    Specimens: none    Complications: none.  Patient tolerated procedure well.    Impression:  Normal colposcopy    Plan:  Recommend repeat pap smear postpartum

## 2025-05-30 ENCOUNTER — ROUTINE PRENATAL (OUTPATIENT)
Dept: OBSTETRICS AND GYNECOLOGY | Facility: CLINIC | Age: 25
End: 2025-05-30
Payer: COMMERCIAL

## 2025-05-30 VITALS — BODY MASS INDEX: 36.32 KG/M2 | DIASTOLIC BLOOD PRESSURE: 60 MMHG | WEIGHT: 205 LBS | SYSTOLIC BLOOD PRESSURE: 96 MMHG

## 2025-05-30 DIAGNOSIS — R87.612 LGSIL ON PAP SMEAR OF CERVIX: ICD-10-CM

## 2025-05-30 DIAGNOSIS — Z13.89 SCREENING FOR BLOOD OR PROTEIN IN URINE: ICD-10-CM

## 2025-05-30 DIAGNOSIS — Z34.80 SUPERVISION OF OTHER NORMAL PREGNANCY, ANTEPARTUM: Primary | ICD-10-CM

## 2025-05-30 DIAGNOSIS — Z87.59 HISTORY OF PRETERM PREMATURE RUPTURE OF MEMBRANES (PPROM): ICD-10-CM

## 2025-05-30 DIAGNOSIS — Z98.891 S/P CESAREAN SECTION: ICD-10-CM

## 2025-05-30 DIAGNOSIS — L73.2 HIDRADENITIS AXILLARIS: ICD-10-CM

## 2025-05-30 LAB
GLUCOSE UR STRIP-MCNC: NEGATIVE MG/DL
PROT UR STRIP-MCNC: ABNORMAL MG/DL

## 2025-05-30 RX ORDER — CLINDAMYCIN PHOSPHATE 1 G/10ML
1 GEL TOPICAL DAILY
Qty: 75 ML | Refills: 0 | Status: SHIPPED | OUTPATIENT
Start: 2025-05-30

## 2025-05-30 NOTE — PROGRESS NOTES
Patient or patient representative verbalized consent for the use of Ambient Listening during the visit with  Claire Chong MD for chart documentation. 2025  12:03 EDT    History of Present Illness  The patient is a 25-year-old -1-1-1 at 21 weeks and 4 days, here for a routine OB visit. She has a pregnancy complicated by a history of a  and urinary tract infection as well as an LSIL Pap smear for which she had a colposcopy last time. She had her anatomy ultrasound today that showed suboptimal cardiac and kidney views.    Pregnancy  - History of a   - History of urinary tract infection  - LSIL Pap smear with colposcopy  - Anatomy ultrasound showed suboptimal cardiac and kidney views  - Reports no fetal movements yet apprecaited  - Expressed desire for another   - Inquired about the possibility of air travel to [place] at 28 weeks of gestation  - History of  birth at 35 weeks    Recurrent Axillary Cysts  - Experiencing recurrent axillary cysts attributed to shaving  - Cysts have been present for several months  - Reports no use of antibiotics or topical creams  - Reports no history of MRSA infection    Supplemental information: Past medical history of a blood infection contracted from a swimming pool in [place], which required intravenous antibiotic treatment and hospitalization.      Vitals:    25 1143   BP: 96/60       Physical Exam  Gen: NAD  Skin: scars, tracks on bilateral axillas with dime size red area of fluctuance    Results  - Imaging:    - Anatomy ultrasound showed suboptimal cardiac and kidney views     Diagnosis Plan   1. Supervision of other normal pregnancy, antepartum  Treponema pallidum AB w/Reflex RPR    CBC (No Diff)    Gestational Screen 1 Hr ([facility])      2. History of  premature rupture of membranes (PPROM)  Treponema pallidum AB w/Reflex RPR    CBC (No Diff)    Gestational Screen 1 Hr ([facility])      3. S/P  section  Treponema pallidum  AB w/Reflex RPR    CBC (No Diff)    Gestational Screen 1 Hr ([facility])      4. LGSIL on Pap smear of cervix        5. Hidradenitis axillaris            Assessment & Plan  1. Routine obstetric visit: Stable.  - Anatomy ultrasound conducted today with suboptimal cardiac and kidney views- plan to repeat  - Anterior placenta may contribute to difficulty in perceiving fetal movements.  - Repeat ultrasound recommended at the next appointment in 4 weeks.  - Advised to avoid air travel approaching late  due to history of 35 week delivery.    -  to be scheduled one week prior to due date, with the exact date to be determined by the end of  or 2025.  - Gestational diabetes test to be conducted at subsequent visit.    2. Axillary cysts: Chronic.  Appears c/w HS. Denies any groin lesions  - Apply antibiotic cream to the affected area - clindamycin gel Rx sent  - Contact the office if no improvement within a week.  - Seek immediate medical attention at the hospital if cysts enlarge, especially beyond the size of an egg, or if fever develops.    Follow-up  - Scheduled for a follow-up visit in 4 weeks for an ultrasound and gestational diabetes test.    PROCEDURE  The patient has a history of a .      Claire Chong MD  25 12:03 EDT     Return in about 4 weeks (around 2025) for repeat anatomy US, OB visit, GCT, OB visit.

## 2025-06-24 RX ORDER — CLINDAMYCIN PHOSPHATE 10 MG/1
1 SWAB TOPICAL DAILY PRN
Qty: 1 EACH | Refills: 0 | Status: SHIPPED | OUTPATIENT
Start: 2025-06-24

## 2025-06-27 ENCOUNTER — ROUTINE PRENATAL (OUTPATIENT)
Dept: OBSTETRICS AND GYNECOLOGY | Facility: CLINIC | Age: 25
End: 2025-06-27
Payer: COMMERCIAL

## 2025-06-27 ENCOUNTER — TELEPHONE (OUTPATIENT)
Dept: OBSTETRICS AND GYNECOLOGY | Facility: CLINIC | Age: 25
End: 2025-06-27

## 2025-06-27 VITALS — WEIGHT: 206.8 LBS | SYSTOLIC BLOOD PRESSURE: 101 MMHG | BODY MASS INDEX: 36.64 KG/M2 | DIASTOLIC BLOOD PRESSURE: 61 MMHG

## 2025-06-27 DIAGNOSIS — Z36.89 ENCOUNTER FOR FETAL ANATOMIC SURVEY: ICD-10-CM

## 2025-06-27 DIAGNOSIS — Z98.891 S/P CESAREAN SECTION: ICD-10-CM

## 2025-06-27 DIAGNOSIS — Z87.59 HISTORY OF PRETERM PREMATURE RUPTURE OF MEMBRANES (PPROM): ICD-10-CM

## 2025-06-27 DIAGNOSIS — Z34.80 SUPERVISION OF OTHER NORMAL PREGNANCY, ANTEPARTUM: Primary | ICD-10-CM

## 2025-06-27 DIAGNOSIS — Z13.89 SCREENING FOR BLOOD OR PROTEIN IN URINE: ICD-10-CM

## 2025-06-27 LAB
GLUCOSE UR STRIP-MCNC: NEGATIVE MG/DL
PROT UR STRIP-MCNC: ABNORMAL MG/DL

## 2025-06-27 PROCEDURE — 99213 OFFICE O/P EST LOW 20 MIN: CPT | Performed by: OBSTETRICS & GYNECOLOGY

## 2025-06-27 NOTE — PROGRESS NOTES
Patient or patient representative verbalized consent for the use of Ambient Listening during the visit with  Claire Chong MD for chart documentation. 2025  12:36 EDT    History of Present Illness  The patient is a 25-year-old -1-1-1, here for a routine OB visit.    Pregnancy Complications  - Low-grade squamous intraepithelial lesion found on Pap  - Plan for a Pap smear postpartum  - Follow-up anatomy ultrasound today  - Cardiac anatomy was still suboptimal    Pelvic Pain  - Persistent pelvic pain  - Has not utilized a support belt during this pregnancy    Supplemental information: None      Vitals:    25 1137   BP: 101/61       Physical Exam  Gen: NAD    Results  - Imaging:    - Anatomy ultrasound:      - Amniotic fluid: normal at 7.3      - Cardiac anatomy: still suboptimal     Diagnosis Plan   1. Supervision of other normal pregnancy, antepartum        2. Screening for blood or protein in urine  POC Urinalysis Dipstick      3. S/P  section        4. History of  premature rupture of membranes (PPROM)        5. Encounter for fetal anatomic survey  Ambulatory Referral to M/Perinatology          Assessment & Plan  1. Routine obstetric visit: Second trimester.  - Fetus weighing approximately 2 pounds.  - Cardiac anatomy remains suboptimal.  - Utilize a support belt to alleviate pelvic discomfort.  - Referral to maternal-fetal medicine specialist for further evaluation of fetal cardiac anatomy.  - Gestational diabetes test to be conducted during the next visit.    2. Low-grade squamous intraepithelial lesion: Found on Pap smear.  - Plan to perform a Pap smear postpartum.    Follow-up  - Follow up in 3 weeks.      Claire Chong MD  25 12:36 EDT     Return in about 3 weeks (around 2025) for GCT, OB visit.

## 2025-07-03 ENCOUNTER — TELEPHONE (OUTPATIENT)
Dept: OBSTETRICS AND GYNECOLOGY | Facility: CLINIC | Age: 25
End: 2025-07-03
Payer: COMMERCIAL

## 2025-07-07 ENCOUNTER — TELEPHONE (OUTPATIENT)
Dept: OBSTETRICS AND GYNECOLOGY | Facility: CLINIC | Age: 25
End: 2025-07-07
Payer: COMMERCIAL

## 2025-07-07 NOTE — TELEPHONE ENCOUNTER
Pt does not need US w/our office.  ultrasound with MFM not us.  Dr Chong entered the referral/order on 6/27. MFM usually calls them to schedule the ultrasound.

## 2025-07-09 ENCOUNTER — TRANSCRIBE ORDERS (OUTPATIENT)
Dept: ULTRASOUND IMAGING | Facility: HOSPITAL | Age: 25
End: 2025-07-09
Payer: COMMERCIAL

## 2025-07-09 DIAGNOSIS — O28.3 ABNORMAL FETAL ULTRASOUND: Primary | ICD-10-CM

## 2025-07-16 NOTE — PROGRESS NOTES
MATERNAL FETAL MEDICINE Consult Note    Dear Dr Claire Chong MD:    Thank you for your kind referral of Marilu Neely.  As you know, she is a 25 y.o.  28w3d gestation (Estimated Date of Delivery: 10/6/25). This is a consult.      Her antepartum course is complicated by:  - Incomplete anatomical survey on office scan (outflow tracts and four chamber views)  - History of  delivery    Aneuploidy Screening:  Carrier Screening:    HPI: Today, she denies headache, blurry vision, RUQ pain. No vaginal bleeding, no contractions. She feels well today.   iPad [id number]    Review of History:  Past Medical History:   Diagnosis Date    Abnormal Pap smear of cervix 2023    ASCUS (+)    Cataract     Pabon-Herminio syndrome     HPV (human papilloma virus) infection 2023     Past Surgical History:   Procedure Laterality Date    APPENDECTOMY       SECTION N/A 2023    Procedure:  SECTION PRIMARY;  Surgeon: Andreia Gillis MD;  Location: [facility] LABOR DELIVERY;  Service: Obstetrics/Gynecology;  Laterality: N/A;    EYE SURGERY         OB Hx:  OB History    Para Term  AB Living   3 1  1 1 1   SAB IAB Ectopic Molar Multiple Live Births       0 1      # Outcome Date GA Lbr Gonzalez/2nd Weight Sex Type Anes PTL Lv   3 Current            2  25 36w1d  2440 g (5 lb 6.1 oz) F CS-LTranv Spinal Y SIMEON      Birth Comments: panda or 1      Complications: Fetal Intolerance   1 AB 2017               Social History     Socioeconomic History    Marital status: Single   Tobacco Use    Smoking status: Never    Smokeless tobacco: Never   Vaping Use    Vaping status: Never Used   Substance and Sexual Activity    Alcohol use: Not Currently    Drug use: Never    Sexual activity: Yes     Partners: Male     Birth control/protection: None     Family History   Problem Relation Age of Onset    No Known Problems Mother     No Known Problems Father       No Known Allergies  "  Current Outpatient Medications on File Prior to Visit   Medication Sig Dispense Refill    Clindamycin Phos & Cleanser (Clindacin Pac) 1 % kit Apply 1 dose topically Daily As Needed (acne). 1 each 0    Clindamycin Phos, Once-Daily, (Clindamycin 1% external gel) 1 % gel Apply 1 dose topically to the appropriate area as directed Daily. 75 mL 0    doxylamine-pyridoxine ER (Bonjesta) 20-20 MG tablet controlled-release tablet Take 1 tablet by mouth Every Morning Before Breakfast. Increase to one tablet QAM and one tablet QPM if needed 60 tablet 1    ondansetron ODT (ZOFRAN-ODT) 4 MG disintegrating tablet Take 1 tablet by mouth Every 8 (Eight) Hours As Needed for Nausea or Vomiting. 30 tablet 0    Prenatal Vit-Fe Fumarate-FA (Prenatal Vitamin) 27-0.8 MG tablet Take 1 tablet by mouth Daily. 90 tablet 4    cephalexin (KEFLEX) 500 MG capsule Take 1 capsule by mouth Every 6 (Six) Hours. (Patient not taking: Reported on 7/17/2025)      metroNIDAZOLE (METROGEL) 0.75 % vaginal gel Insert 1 Applicatorful into the vagina Every Night. (Patient not taking: Reported on 7/17/2025) 70 g 0     No current facility-administered medications on file prior to visit.        Past obstetric, gynecological, medical, surgical, family and social history reviewed.  Relevant lab work and imaging reviewed.    Review of systems  Constitutional:  denies fever, chills, malaise.   ENT/Mouth:  denies sore throat, tinnitus  Eyes: denies vision changes/pain  CV:  denies chest pain  Respiratory:  denies cough/SOB  GI:  denies N/V, diarrhea, abdominal pain.    :   denies dysuria  Skin:  denies lesions or pruritus   Neuro:  denies weakness, focal neurologic symptoms    Vitals:    07/17/25 1406   BP: 123/71   BP Location: Left arm   Patient Position: Sitting   Pulse: 83   Temp: 97.7 °F (36.5 °C)   TempSrc: Tympanic   SpO2: 99%   Weight: 94.6 kg (208 lb 8 oz)   Height: 157.5 cm (62\")       PHYSICAL EXAM   GENERAL: Not in acute distress, AAOx3, " pleasant    NEURO: awake, alert and oriented to person, place, and time     ULTRASOUND   Please view full ultrasound note on Imaging tab in ViewPoint.  Cephalic  Placenta anterior  EFW 1340 grams, 2 lb 15 oz, 63rd percentile with AC at the 70th percentile  BPP 8/8  Amniotic fluid - mild polyhydramnios, ORION 30 cm  Anatomical survey:    - Incomplete cardiac views   - Incomplete assessment of Cavum Septum Pellucidum (However, image 20 clip appears normal)    ASSESSMENT/COUNSELIN y.o.   28w2d gestation (Estimated Date of Delivery: 10/6/25).     DISCUSSION  Today's findings were reviewed.  Cardiac imaging was severely limited, so follow up was scheduled.     Given her BMI, she is at increased risk for...  - Hypertensive disorders of pregnancy - baby aspirin can reduce risk. However, if she hasn't started by 28 weeks, there is no evidence to suggest that a 3rd trimester start would be effective.    - Gestational Diabetes - her one hour glucose tolerance test has yet to be done, but order is in.      -Pregnancy  [ X ] stable  [   ] improving [  ] worsening    Diagnoses and all orders for this visit:    1. BMI 35.0-35.9,adult (Primary)    2. Obesity in pregnancy, antepartum       Summary of Plan  -Serial growth ultrasounds every 4 - 5 weeks (next scan will be done by MFM, thereafter to be determined).   -Starting at 32 weeks: Weekly fetal  surveillance until delivery. If polyhydramnios resolves, may start at 36 weeks.   - Needs one hour glucola.     Follow-up:   - Four week follow up with MFM scheduled (complete anatomical survey and recheck ORION)    Thank you for the consult and opportunity to care for this patient.  Please feel free to reach out with any questions or concerns.      I spent 30 minutes caring for this patient on this date of service. This time includes time spent by me in the following activities: preparing for the visit, reviewing tests, obtaining and/or reviewing a separately obtained  history, performing a medically appropriate examination and/or evaluation, counseling and educating the patient/family/caregiver and independently interpreting results and communicating that information with the patient/family/caregiver with greater than 50% spent in counseling and coordination of care. This time did NOT include time for interpretation of imaging or electronic fetal monitoring.     Pablito Eldridge MD FACOG  Maternal Fetal Medicine-[facility]  Office: [phone]  [email or web address]

## 2025-07-17 ENCOUNTER — OFFICE VISIT (OUTPATIENT)
Dept: OBSTETRICS AND GYNECOLOGY | Facility: CLINIC | Age: 25
End: 2025-07-17
Payer: COMMERCIAL

## 2025-07-17 ENCOUNTER — HOSPITAL ENCOUNTER (OUTPATIENT)
Dept: ULTRASOUND IMAGING | Facility: HOSPITAL | Age: 25
Discharge: HOME | End: 2025-07-17
Admitting: OBSTETRICS & GYNECOLOGY
Payer: COMMERCIAL

## 2025-07-17 VITALS
TEMPERATURE: 97.7 F | BODY MASS INDEX: 38.37 KG/M2 | WEIGHT: 208.5 LBS | DIASTOLIC BLOOD PRESSURE: 71 MMHG | OXYGEN SATURATION: 99 % | HEART RATE: 83 BPM | SYSTOLIC BLOOD PRESSURE: 123 MMHG | HEIGHT: 62 IN

## 2025-07-17 DIAGNOSIS — O28.3 ABNORMAL FETAL ULTRASOUND: ICD-10-CM

## 2025-07-17 DIAGNOSIS — O99.210 OBESITY IN PREGNANCY, ANTEPARTUM: ICD-10-CM

## 2025-07-17 PROCEDURE — 76819 FETAL BIOPHYS PROFIL W/O NST: CPT

## 2025-07-17 PROCEDURE — 76811 OB US DETAILED SNGL FETUS: CPT

## 2025-07-17 NOTE — PROGRESS NOTES
Pt reports that she is doing well and denies vaginal bleeding, or LOF at this time. Notes experiencing daily irregular contractions, denies consistency. Reports active fetal movement. Reviewed when to call OB office or present to L&D for evaluation with symptoms such as decreased fetal movement, vaginal bleeding, LOF or ctxs. Pt verbalized understanding. Denies HA, visual changes or epigastric pain. Denies any additional complaints at time of appointment. Next OB appointment scheduled for 7/18.      Vitals:    07/17/25 1406   BP: 123/71   Pulse: 83   Temp: 97.7 °F (36.5 °C)   SpO2: 99%

## 2025-07-17 NOTE — LETTER
2025     Claire Chong MD  [address]  [address]  [facility] [address]    Patient: Marilu Neely   YOB: 2000   Date of Visit: 2025       Dear Claire Chong MD,    Thank you for referring Marilu Neely to me for evaluation. Below is a copy of my consult note.    If you have questions, please do not hesitate to call me. I look forward to following Marilu along with you.         Sincerely,        Pablito Eldridge MD        CC: No Recipients    MATERNAL FETAL MEDICINE Consult Note    Dear Dr Claire Chong MD:    Thank you for your kind referral of Marilu Neely.  As you know, she is a 25 y.o.  28w3d gestation (Estimated Date of Delivery: 10/6/25). This is a consult.      Her antepartum course is complicated by:  - Incomplete anatomical survey on office scan (outflow tracts and four chamber views)  - History of  delivery    Aneuploidy Screening:  Carrier Screening:    HPI: Today, she denies headache, blurry vision, RUQ pain. No vaginal bleeding, no contractions. She feels well today.   iPad [id number]    Review of History:  Past Medical History:   Diagnosis Date   • Abnormal Pap smear of cervix 2023    ASCUS (+)   • Cataract    • Pabon-Herminio syndrome    • HPV (human papilloma virus) infection 2023     Past Surgical History:   Procedure Laterality Date   • APPENDECTOMY     •  SECTION N/A 2023    Procedure:  SECTION PRIMARY;  Surgeon: Andreia Gillis MD;  Location: [facility] LABOR DELIVERY;  Service: Obstetrics/Gynecology;  Laterality: N/A;   • EYE SURGERY         OB Hx:  OB History    Para Term  AB Living   3 1  1 1 1   SAB IAB Ectopic Molar Multiple Live Births       0 1      # Outcome Date GA Lbr Gonzalez/2nd Weight Sex Type Anes PTL Lv   3 Current            2  25 36w1d  2440 g (5 lb 6.1 oz) F CS-LTranv Spinal Y SIMEON      Birth Comments: panda or 1      Complications: Fetal  Intolerance   1 AB 2017               Social History     Socioeconomic History   • Marital status: Single   Tobacco Use   • Smoking status: Never   • Smokeless tobacco: Never   Vaping Use   • Vaping status: Never Used   Substance and Sexual Activity   • Alcohol use: Not Currently   • Drug use: Never   • Sexual activity: Yes     Partners: Male     Birth control/protection: None     Family History   Problem Relation Age of Onset   • No Known Problems Mother    • No Known Problems Father       No Known Allergies   Current Outpatient Medications on File Prior to Visit   Medication Sig Dispense Refill   • Clindamycin Phos & Cleanser (Clindacin Pac) 1 % kit Apply 1 dose topically Daily As Needed (acne). 1 each 0   • Clindamycin Phos, Once-Daily, (Clindamycin 1% external gel) 1 % gel Apply 1 dose topically to the appropriate area as directed Daily. 75 mL 0   • doxylamine-pyridoxine ER (Bonjesta) 20-20 MG tablet controlled-release tablet Take 1 tablet by mouth Every Morning Before Breakfast. Increase to one tablet QAM and one tablet QPM if needed 60 tablet 1   • ondansetron ODT (ZOFRAN-ODT) 4 MG disintegrating tablet Take 1 tablet by mouth Every 8 (Eight) Hours As Needed for Nausea or Vomiting. 30 tablet 0   • Prenatal Vit-Fe Fumarate-FA (Prenatal Vitamin) 27-0.8 MG tablet Take 1 tablet by mouth Daily. 90 tablet 4   • cephalexin (KEFLEX) 500 MG capsule Take 1 capsule by mouth Every 6 (Six) Hours. (Patient not taking: Reported on 7/17/2025)     • metroNIDAZOLE (METROGEL) 0.75 % vaginal gel Insert 1 Applicatorful into the vagina Every Night. (Patient not taking: Reported on 7/17/2025) 70 g 0     No current facility-administered medications on file prior to visit.        Past obstetric, gynecological, medical, surgical, family and social history reviewed.  Relevant lab work and imaging reviewed.    Review of systems  Constitutional:  denies fever, chills, malaise.   ENT/Mouth:  denies sore throat, tinnitus  Eyes: denies vision  "changes/pain  CV:  denies chest pain  Respiratory:  denies cough/SOB  GI:  denies N/V, diarrhea, abdominal pain.    :   denies dysuria  Skin:  denies lesions or pruritus   Neuro:  denies weakness, focal neurologic symptoms    Vitals:    25 1406   BP: 123/71   BP Location: Left arm   Patient Position: Sitting   Pulse: 83   Temp: 97.7 °F (36.5 °C)   TempSrc: Tympanic   SpO2: 99%   Weight: 94.6 kg (208 lb 8 oz)   Height: 157.5 cm (62\")       PHYSICAL EXAM   GENERAL: Not in acute distress, AAOx3, pleasant    NEURO: awake, alert and oriented to person, place, and time     ULTRASOUND   Please view full ultrasound note on Imaging tab in ViewPoint.  Cephalic  Placenta anterior  EFW 1340 grams, 2 lb 15 oz, 63rd percentile with AC at the 70th percentile  BPP 8/8  Amniotic fluid - mild polyhydramnios, ORION 30 cm  Anatomical survey:    - Incomplete cardiac views   - Incomplete assessment of Cavum Septum Pellucidum (However, image 20 clip appears normal)    ASSESSMENT/COUNSELIN y.o.   28w2d gestation (Estimated Date of Delivery: 10/6/25).     DISCUSSION  Today's findings were reviewed.  Cardiac imaging was severely limited, so follow up was scheduled.     Given her BMI, she is at increased risk for...  - Hypertensive disorders of pregnancy - baby aspirin can reduce risk. However, if she hasn't started by 28 weeks, there is no evidence to suggest that a 3rd trimester start would be effective.    - Gestational Diabetes - her one hour glucose tolerance test has yet to be done, but order is in.      -Pregnancy  [ X ] stable  [   ] improving [  ] worsening    Diagnoses and all orders for this visit:    1. BMI 35.0-35.9,adult (Primary)    2. Obesity in pregnancy, antepartum       Summary of Plan  -Serial growth ultrasounds every 4 - 5 weeks (next scan will be done by M, thereafter to be determined).   -Starting at 32 weeks: Weekly fetal  surveillance until delivery. If polyhydramnios resolves, may start " at 36 weeks.   - Needs one hour glucola.     Follow-up:   - Four week follow up with MFM scheduled (complete anatomical survey and recheck ORION)    Thank you for the consult and opportunity to care for this patient.  Please feel free to reach out with any questions or concerns.      I spent 30 minutes caring for this patient on this date of service. This time includes time spent by me in the following activities: preparing for the visit, reviewing tests, obtaining and/or reviewing a separately obtained history, performing a medically appropriate examination and/or evaluation, counseling and educating the patient/family/caregiver and independently interpreting results and communicating that information with the patient/family/caregiver with greater than 50% spent in counseling and coordination of care. This time did NOT include time for interpretation of imaging or electronic fetal monitoring.     Pablito Eldridge MD FACOG  Maternal Fetal Medicine-[facility]  Office: [phone]  [email or web address] - is now [employer]          Pt reports that she is doing well and denies vaginal bleeding, or LOF at this time. Notes experiencing daily irregular contractions, denies consistency. Reports active fetal movement. Reviewed when to call OB office or present to L&D for evaluation with symptoms such as decreased fetal movement, vaginal bleeding, LOF or ctxs. Pt verbalized understanding. Denies HA, visual changes or epigastric pain. Denies any additional complaints at time of appointment. Next OB appointment scheduled for 7/18.      Vitals:    07/17/25 1406   BP: 123/71   Pulse: 83   Temp: 97.7 °F (36.5 °C)   SpO2: 99%

## 2025-07-19 PROBLEM — O99.210 OBESITY IN PREGNANCY, ANTEPARTUM: Status: ACTIVE | Noted: 2025-07-19

## 2025-07-29 ENCOUNTER — ROUTINE PRENATAL (OUTPATIENT)
Dept: OBSTETRICS AND GYNECOLOGY | Facility: CLINIC | Age: 25
End: 2025-07-29
Payer: COMMERCIAL

## 2025-07-29 VITALS — BODY MASS INDEX: 38.04 KG/M2 | SYSTOLIC BLOOD PRESSURE: 126 MMHG | DIASTOLIC BLOOD PRESSURE: 72 MMHG | WEIGHT: 208 LBS

## 2025-07-29 DIAGNOSIS — O40.3XX0 POLYHYDRAMNIOS IN THIRD TRIMESTER COMPLICATION, SINGLE OR UNSPECIFIED FETUS: ICD-10-CM

## 2025-07-29 DIAGNOSIS — Z98.891 S/P CESAREAN SECTION: ICD-10-CM

## 2025-07-29 DIAGNOSIS — Z34.80 SUPERVISION OF OTHER NORMAL PREGNANCY, ANTEPARTUM: Primary | ICD-10-CM

## 2025-07-29 DIAGNOSIS — R87.612 LGSIL ON PAP SMEAR OF CERVIX: ICD-10-CM

## 2025-07-29 NOTE — PROGRESS NOTES
Patient or patient representative verbalized consent for the use of Ambient Listening during the visit with  Claire Chong MD for chart documentation. 2025  14:30 EDT    History of Present Illness  The patient is a 25-year-old -1-1-1 at 30 weeks and 1 day, here for a routine OB visit. She was seen by [facility] for an incomplete anatomical survey. She also has a history of a . [facility] recommended serial growth ultrasound, so the next will be done by M, and then she will follow up with us. She also had polyhydramnios, and they recommended antepartum fetal surveillance. However, if this resolves, antepartum fetal surveillance can start at 36 weeks. She is doing her glucose test today. Her ORION on her last ultrasound was 29.7.    Polyhydramnios  - Recommended antepartum fetal surveillance  - ORION on last ultrasound was 29.7  - Surveillance can start at 36 weeks if resolved    Desire for Another   - Expressed desire for another   - Considering getting an implant during postpartum period    Two weeks ago, she had an appointment at the hospital, where she was informed that everything was normal with the anatomy. However, she was not aware of the slightly elevated amniotic fluid level. The next ultrasound is scheduled for 2025. Depending on the results, further tests may be necessary.    GYNECOLOGICAL HISTORY:  Gynecology History discussed    CONTRACEPTION:  Considering implant postpartum    OBSTETRICAL HISTORY:  Obstetrics History discussed   3, Para 0-1-1-1  Gestational age: 30 weeks and 1 day    PAST SURGICAL HISTORY:  History of       Vitals:    25 1309   BP: 126/72       Physical Exam  Gen: NAD  FHT +    Results  - Imaging:    - Ultrasound: Amniotic fluid index (ORION) was 29.7     Diagnosis Plan   1. Supervision of other normal pregnancy, antepartum        2. S/P  section  Case Request      3. LGSIL on Pap smear of cervix        4. Polyhydramnios in third  trimester complication, single or unspecified fetus            Assessment & Plan  1. Routine obstetric visit: Stable.  - Blood pressure readings were within the normal range during this visit.  - The slightly elevated amniotic fluid level was discussed, with a plan to monitor it closely.  - The most common reason for this high level is gestational diabetes.  - Immediate medical attention at the hospital was advised if severe pain occurs.  - A  will be scheduled between 39 to 40 weeks of gestation, with the week before 10/06/2025 being the target.  - If labor begins earlier than expected, the  will be performed upon arrival at the hospital.  - Tubal ligation was discussed, but it was advised only if completely certain about this decision. If decided against after signing the consent form, the procedure will not be performed.  She has decided she prefers the Nexplanon postpartum  - A biophysical profile (BPP) ultrasound will be conducted in 2 weeks to assess the amniotic fluid level and fetal movements.  - Blood tests will be performed today to check hemoglobin and syphilis status.    2. Polyhydramnios: Stable. ORION 29.7 on last ultrasound.  - The most common cause for elevated amniotic fluid is gestational diabetes.  - Undergoing a glucose test today.  - If the glucose level is >135, a 3-hour glucose tolerance test will be discussed, or glucose levels may need to be checked at home.  - The potential complications of high amniotic fluid, such as early water breaking and increased pressure or pain, were discussed.  - The next ultrasound is scheduled for 2025 to monitor the amniotic fluid levels.  - If the levels continue to rise, further testing may be necessary.    3. Human papillomavirus (HPV) infection: Stable.  - Concerns about how the virus was contracted and its implications for her  were expressed.  - Explained that HPV is a very common virus and that most people are exposed to it  through sexual activity.  - Emphasized the importance of continued cytology testing.  - Another test will be conducted after pregnancy to monitor for any changes.  - she prefers to only discuss this in private; I reviewed the importance of asking family to not come with her to appointments dari postpartum as this will need to be discussed and to ask family to leave the room when staff asks for permission for discussion of health information.     Follow-up  - Next ultrasound scheduled for 2025.  -  scheduled between 39 to 40 weeks of gestation, with the week before 10/06/2025 being the target.      Claire Chong MD  25 14:30 EDT     Return in about 2 weeks (around 2025) for OB visit and BPP.

## 2025-07-30 ENCOUNTER — RESULTS FOLLOW-UP (OUTPATIENT)
Dept: OBSTETRICS AND GYNECOLOGY | Facility: CLINIC | Age: 25
End: 2025-07-30
Payer: COMMERCIAL

## 2025-07-30 LAB
ERYTHROCYTE [DISTWIDTH] IN BLOOD BY AUTOMATED COUNT: 12.9 % (ref 11.7–15.4)
GLUCOSE 1H P 50 G GLC PO SERPL-MCNC: 109 MG/DL (ref 70–139)
HCT VFR BLD AUTO: 31.2 % (ref 34–46.6)
HGB BLD-MCNC: 10.1 G/DL (ref 11.1–15.9)
MCH RBC QN AUTO: 28 PG (ref 26.6–33)
MCHC RBC AUTO-ENTMCNC: 32.4 G/DL (ref 31.5–35.7)
MCV RBC AUTO: 86 FL (ref 79–97)
PLATELET # BLD AUTO: 369 X10E3/UL (ref 150–450)
RBC # BLD AUTO: 3.61 X10E6/UL (ref 3.77–5.28)
WBC # BLD AUTO: 13.2 X10E3/UL (ref 3.4–10.8)

## 2025-07-30 RX ORDER — FERROUS SULFATE 325(65) MG
325 TABLET ORAL
Qty: 90 TABLET | Refills: 1 | Status: SHIPPED | OUTPATIENT
Start: 2025-07-30

## 2025-07-31 ENCOUNTER — TELEPHONE (OUTPATIENT)
Dept: OBSTETRICS AND GYNECOLOGY | Facility: CLINIC | Age: 25
End: 2025-07-31
Payer: COMMERCIAL

## 2025-07-31 LAB — TREPONEMA PALLIDUM IGG+IGM AB [PRESENCE] IN SERUM OR PLASMA BY IMMUNOASSAY: NON REACTIVE

## 2025-08-01 ENCOUNTER — TELEPHONE (OUTPATIENT)
Dept: OBSTETRICS AND GYNECOLOGY | Facility: CLINIC | Age: 25
End: 2025-08-01
Payer: COMMERCIAL

## 2025-08-01 NOTE — TELEPHONE ENCOUNTER
Spoke with pt, went over  dates/details. Advised that I mailed a copy of the instructions to pt. Verified address, pt understood

## 2025-08-05 ENCOUNTER — TRANSCRIBE ORDERS (OUTPATIENT)
Dept: ULTRASOUND IMAGING | Facility: HOSPITAL | Age: 25
End: 2025-08-05
Payer: COMMERCIAL

## 2025-08-05 DIAGNOSIS — O28.3 ABNORMAL FETAL ULTRASOUND: Primary | ICD-10-CM

## 2025-08-12 ENCOUNTER — ROUTINE PRENATAL (OUTPATIENT)
Dept: OBSTETRICS AND GYNECOLOGY | Facility: CLINIC | Age: 25
End: 2025-08-12
Payer: COMMERCIAL

## 2025-08-12 VITALS — DIASTOLIC BLOOD PRESSURE: 63 MMHG | WEIGHT: 214 LBS | BODY MASS INDEX: 39.14 KG/M2 | SYSTOLIC BLOOD PRESSURE: 115 MMHG

## 2025-08-12 DIAGNOSIS — O40.3XX0 POLYHYDRAMNIOS IN THIRD TRIMESTER COMPLICATION, SINGLE OR UNSPECIFIED FETUS: ICD-10-CM

## 2025-08-12 DIAGNOSIS — N89.8 VAGINAL DISCHARGE: ICD-10-CM

## 2025-08-12 DIAGNOSIS — N20.0 KIDNEY STONE: ICD-10-CM

## 2025-08-12 DIAGNOSIS — Z98.891 HISTORY OF CESAREAN DELIVERY: ICD-10-CM

## 2025-08-12 DIAGNOSIS — Z3A.32 32 WEEKS GESTATION OF PREGNANCY: Primary | ICD-10-CM

## 2025-08-12 LAB
GLUCOSE UR STRIP-MCNC: NEGATIVE MG/DL
PROT UR STRIP-MCNC: ABNORMAL MG/DL

## 2025-08-14 ENCOUNTER — ANESTHESIA (OUTPATIENT)
Dept: LABOR AND DELIVERY | Facility: HOSPITAL | Age: 25
End: 2025-08-14
Payer: COMMERCIAL

## 2025-08-14 ENCOUNTER — ANESTHESIA EVENT (OUTPATIENT)
Dept: LABOR AND DELIVERY | Facility: HOSPITAL | Age: 25
End: 2025-08-14
Payer: COMMERCIAL

## 2025-08-14 ENCOUNTER — HOSPITAL ENCOUNTER (OUTPATIENT)
Dept: ULTRASOUND IMAGING | Facility: HOSPITAL | Age: 25
Discharge: HOME | End: 2025-08-14
Admitting: OBSTETRICS & GYNECOLOGY
Payer: COMMERCIAL

## 2025-08-14 ENCOUNTER — HOSPITAL ENCOUNTER (INPATIENT)
Facility: HOSPITAL | Age: 25
LOS: 7 days | Discharge: HOME | End: 2025-08-21
Attending: OBSTETRICS & GYNECOLOGY | Admitting: OBSTETRICS & GYNECOLOGY
Payer: COMMERCIAL

## 2025-08-14 ENCOUNTER — APPOINTMENT (OUTPATIENT)
Dept: CARDIOLOGY | Facility: HOSPITAL | Age: 25
End: 2025-08-14
Payer: COMMERCIAL

## 2025-08-14 ENCOUNTER — APPOINTMENT (OUTPATIENT)
Dept: GENERAL RADIOLOGY | Facility: HOSPITAL | Age: 25
End: 2025-08-14
Payer: COMMERCIAL

## 2025-08-14 ENCOUNTER — OFFICE VISIT (OUTPATIENT)
Dept: OBSTETRICS AND GYNECOLOGY | Facility: CLINIC | Age: 25
End: 2025-08-14
Payer: COMMERCIAL

## 2025-08-14 ENCOUNTER — APPOINTMENT (OUTPATIENT)
Dept: ULTRASOUND IMAGING | Facility: HOSPITAL | Age: 25
End: 2025-08-14
Payer: COMMERCIAL

## 2025-08-14 VITALS
WEIGHT: 211.25 LBS | TEMPERATURE: 97.8 F | SYSTOLIC BLOOD PRESSURE: 101 MMHG | DIASTOLIC BLOOD PRESSURE: 44 MMHG | HEART RATE: 127 BPM | BODY MASS INDEX: 38.87 KG/M2 | OXYGEN SATURATION: 97 % | HEIGHT: 62 IN

## 2025-08-14 DIAGNOSIS — Z98.891 HISTORY OF CESAREAN DELIVERY: ICD-10-CM

## 2025-08-14 DIAGNOSIS — R06.02 SHORTNESS OF BREATH: Primary | ICD-10-CM

## 2025-08-14 DIAGNOSIS — O28.3 ABNORMAL FETAL ULTRASOUND: ICD-10-CM

## 2025-08-14 DIAGNOSIS — N20.0 KIDNEY STONE: ICD-10-CM

## 2025-08-14 PROBLEM — Z34.90 PREGNANT: Status: ACTIVE | Noted: 2023-06-18

## 2025-08-14 LAB
A VAGINAE DNA VAG QL NAA+PROBE: ABNORMAL SCORE
BVAB2 DNA VAG QL NAA+PROBE: ABNORMAL SCORE
C ALBICANS DNA VAG QL NAA+PROBE: NEGATIVE
C GLABRATA DNA VAG QL NAA+PROBE: NEGATIVE
C TRACH DNA SPEC QL NAA+PROBE: NEGATIVE
MEGA1 DNA VAG QL NAA+PROBE: ABNORMAL SCORE
N GONORRHOEA DNA VAG QL NAA+PROBE: NEGATIVE
T VAGINALIS DNA VAG QL NAA+PROBE: NEGATIVE

## 2025-08-14 PROCEDURE — 76819 FETAL BIOPHYS PROFIL W/O NST: CPT

## 2025-08-14 PROCEDURE — 76816 OB US FOLLOW-UP PER FETUS: CPT

## 2025-08-15 ENCOUNTER — DOCUMENTATION (OUTPATIENT)
Dept: OBSTETRICS AND GYNECOLOGY | Facility: CLINIC | Age: 25
End: 2025-08-15
Payer: COMMERCIAL

## 2025-08-15 ENCOUNTER — APPOINTMENT (OUTPATIENT)
Dept: CARDIOLOGY | Facility: HOSPITAL | Age: 25
End: 2025-08-15
Payer: COMMERCIAL

## 2025-08-15 PROBLEM — Z98.891 PREVIOUS CESAREAN SECTION: Status: ACTIVE | Noted: 2025-08-15

## 2025-08-15 PROBLEM — Z86.79: Status: ACTIVE | Noted: 2025-08-15

## 2025-08-15 PROBLEM — I42.9 CARDIOMYOPATHY: Status: ACTIVE | Noted: 2025-08-15

## 2025-08-15 PROBLEM — Z34.90 PREGNANT: Status: RESOLVED | Noted: 2023-06-18 | Resolved: 2025-08-15

## 2025-08-15 PROBLEM — Q87.0: Status: ACTIVE | Noted: 2025-08-15

## 2025-08-15 PROBLEM — Z98.891 STATUS POST CESAREAN DELIVERY: Status: ACTIVE | Noted: 2025-08-15

## 2025-08-15 PROCEDURE — 25010000002 KETOROLAC TROMETHAMINE PER 15 MG: Performed by: ANESTHESIOLOGY

## 2025-08-15 PROCEDURE — 25010000002 OXYTOCIN PER 10 UNITS: Performed by: NURSE ANESTHETIST, CERTIFIED REGISTERED

## 2025-08-15 PROCEDURE — 25010000002 PHENYLEPHRINE 10 MG/ML SOLUTION 5 ML VIAL: Performed by: NURSE ANESTHETIST, CERTIFIED REGISTERED

## 2025-08-15 PROCEDURE — 25010000002 FAMOTIDINE 10 MG/ML SOLUTION: Performed by: NURSE ANESTHETIST, CERTIFIED REGISTERED

## 2025-08-15 PROCEDURE — 25010000002 MORPHINE PER 10 MG: Performed by: NURSE ANESTHETIST, CERTIFIED REGISTERED

## 2025-08-15 PROCEDURE — 25010000002 LIDOCAINE 2% SOLUTION: Performed by: NURSE ANESTHETIST, CERTIFIED REGISTERED

## 2025-08-15 PROCEDURE — C1755 CATHETER, INTRASPINAL: HCPCS | Performed by: ANESTHESIOLOGY

## 2025-08-15 PROCEDURE — 25010000002 ONDANSETRON PER 1 MG: Performed by: NURSE ANESTHETIST, CERTIFIED REGISTERED

## 2025-08-15 PROCEDURE — 25810000003 SODIUM CHLORIDE 0.9 % SOLUTION 250 ML FLEX CONT: Performed by: NURSE ANESTHETIST, CERTIFIED REGISTERED

## 2025-08-15 PROCEDURE — 25810000003 LACTATED RINGERS PER 1000 ML: Performed by: NURSE ANESTHETIST, CERTIFIED REGISTERED

## 2025-08-15 PROCEDURE — 25010000002 LIDOCAINE-EPINEPHRINE (PF) 2 %-1:200000 SOLUTION: Performed by: NURSE ANESTHETIST, CERTIFIED REGISTERED

## 2025-08-15 RX ORDER — ONDANSETRON 2 MG/ML
INJECTION INTRAMUSCULAR; INTRAVENOUS AS NEEDED
Status: DISCONTINUED | OUTPATIENT
Start: 2025-08-15 | End: 2025-08-15 | Stop reason: SURG

## 2025-08-15 RX ORDER — LIDOCAINE HYDROCHLORIDE 20 MG/ML
INJECTION, SOLUTION INFILTRATION; PERINEURAL AS NEEDED
Status: DISCONTINUED | OUTPATIENT
Start: 2025-08-15 | End: 2025-08-15 | Stop reason: SURG

## 2025-08-15 RX ORDER — FAMOTIDINE 10 MG/ML
INJECTION, SOLUTION INTRAVENOUS AS NEEDED
Status: DISCONTINUED | OUTPATIENT
Start: 2025-08-15 | End: 2025-08-15 | Stop reason: SURG

## 2025-08-15 RX ORDER — LIDOCAINE HYDROCHLORIDE AND EPINEPHRINE 20; 5 MG/ML; UG/ML
INJECTION, SOLUTION EPIDURAL; INFILTRATION; INTRACAUDAL; PERINEURAL AS NEEDED
Status: DISCONTINUED | OUTPATIENT
Start: 2025-08-15 | End: 2025-08-15 | Stop reason: SURG

## 2025-08-15 RX ORDER — PHENYLEPHRINE HCL IN 0.9% NACL 1 MG/10 ML
SYRINGE (ML) INTRAVENOUS AS NEEDED
Status: DISCONTINUED | OUTPATIENT
Start: 2025-08-15 | End: 2025-08-15 | Stop reason: SURG

## 2025-08-15 RX ORDER — MORPHINE SULFATE 1 MG/ML
INJECTION, SOLUTION EPIDURAL; INTRATHECAL; INTRAVENOUS AS NEEDED
Status: DISCONTINUED | OUTPATIENT
Start: 2025-08-15 | End: 2025-08-15 | Stop reason: SURG

## 2025-08-15 RX ORDER — KETOROLAC TROMETHAMINE 30 MG/ML
INJECTION, SOLUTION INTRAMUSCULAR; INTRAVENOUS AS NEEDED
Status: DISCONTINUED | OUTPATIENT
Start: 2025-08-15 | End: 2025-08-15 | Stop reason: SURG

## 2025-08-15 RX ADMIN — LIDOCAINE HYDROCHLORIDE 2 MG: 20 INJECTION, SOLUTION INFILTRATION; PERINEURAL at 13:32

## 2025-08-15 RX ADMIN — LIDOCAINE HYDROCHLORIDE 2 MG: 20 INJECTION, SOLUTION INFILTRATION; PERINEURAL at 13:36

## 2025-08-15 RX ADMIN — Medication 200 MCG: at 13:57

## 2025-08-15 RX ADMIN — Medication 100 MCG: at 14:53

## 2025-08-15 RX ADMIN — KETOROLAC TROMETHAMINE 30 MG: 30 INJECTION, SOLUTION INTRAMUSCULAR; INTRAVENOUS at 15:18

## 2025-08-15 RX ADMIN — LIDOCAINE HYDROCHLORIDE 2 MG: 20 INJECTION, SOLUTION INFILTRATION; PERINEURAL at 14:37

## 2025-08-15 RX ADMIN — LIDOCAINE HYDROCHLORIDE 2 MG: 20 INJECTION, SOLUTION INFILTRATION; PERINEURAL at 14:02

## 2025-08-15 RX ADMIN — PHENYLEPHRINE HYDROCHLORIDE 0.2 MCG/KG/MIN: 10 INJECTION, SOLUTION INTRAVENOUS at 13:42

## 2025-08-15 RX ADMIN — Medication 200 MCG: at 13:47

## 2025-08-15 RX ADMIN — Medication 200 MCG: at 13:50

## 2025-08-15 RX ADMIN — OXYTOCIN 0.12 UNITS/MIN: 10 INJECTION INTRAVENOUS at 14:35

## 2025-08-15 RX ADMIN — LIDOCAINE HYDROCHLORIDE 2 MG: 20 INJECTION, SOLUTION INFILTRATION; PERINEURAL at 14:15

## 2025-08-15 RX ADMIN — LIDOCAINE HYDROCHLORIDE 2 MG: 20 INJECTION, SOLUTION INFILTRATION; PERINEURAL at 14:07

## 2025-08-15 RX ADMIN — LIDOCAINE HYDROCHLORIDE 2 MG: 20 INJECTION, SOLUTION INFILTRATION; PERINEURAL at 13:57

## 2025-08-15 RX ADMIN — Medication 200 MCG: at 13:44

## 2025-08-15 RX ADMIN — MORPHINE SULFATE 1 MG: 1 INJECTION, SOLUTION EPIDURAL; INTRATHECAL; INTRAVENOUS at 14:48

## 2025-08-15 RX ADMIN — MORPHINE SULFATE 1 MG: 1 INJECTION, SOLUTION EPIDURAL; INTRATHECAL; INTRAVENOUS at 13:44

## 2025-08-15 RX ADMIN — LIDOCAINE HYDROCHLORIDE AND EPINEPHRINE 1 ML: 20; 5 INJECTION, SOLUTION EPIDURAL; INFILTRATION; INTRACAUDAL; PERINEURAL at 13:29

## 2025-08-15 RX ADMIN — FAMOTIDINE 20 MG: 10 INJECTION INTRAVENOUS at 14:06

## 2025-08-15 RX ADMIN — ONDANSETRON 4 MG: 2 INJECTION INTRAMUSCULAR; INTRAVENOUS at 14:06

## 2025-08-15 RX ADMIN — LIDOCAINE HYDROCHLORIDE 2 MG: 20 INJECTION, SOLUTION INFILTRATION; PERINEURAL at 13:48

## 2025-08-15 RX ADMIN — LIDOCAINE HYDROCHLORIDE 2 MG: 20 INJECTION, SOLUTION INFILTRATION; PERINEURAL at 13:54

## 2025-08-15 RX ADMIN — Medication 200 MCG: at 14:49

## 2025-08-15 RX ADMIN — LIDOCAINE HYDROCHLORIDE 3 MG: 20 INJECTION, SOLUTION INFILTRATION; PERINEURAL at 14:22

## 2025-08-15 RX ADMIN — LIDOCAINE HYDROCHLORIDE 1 MG: 20 INJECTION, SOLUTION INFILTRATION; PERINEURAL at 14:35

## 2025-08-15 RX ADMIN — LIDOCAINE HYDROCHLORIDE 2 MG: 20 INJECTION, SOLUTION INFILTRATION; PERINEURAL at 14:29

## 2025-08-15 RX ADMIN — MORPHINE SULFATE 1 MG: 1 INJECTION, SOLUTION EPIDURAL; INTRATHECAL; INTRAVENOUS at 14:17

## 2025-08-15 RX ADMIN — LIDOCAINE HYDROCHLORIDE 2 MG: 20 INJECTION, SOLUTION INFILTRATION; PERINEURAL at 14:11

## 2025-08-15 RX ADMIN — Medication 100 MCG: at 14:18

## 2025-08-15 RX ADMIN — MORPHINE SULFATE 1 MG: 1 INJECTION, SOLUTION EPIDURAL; INTRATHECAL; INTRAVENOUS at 14:14

## 2025-08-15 RX ADMIN — LIDOCAINE HYDROCHLORIDE 2 MG: 20 INJECTION, SOLUTION INFILTRATION; PERINEURAL at 13:41

## 2025-08-15 RX ADMIN — LIDOCAINE HYDROCHLORIDE 2 MG: 20 INJECTION, SOLUTION INFILTRATION; PERINEURAL at 14:41

## 2025-08-15 RX ADMIN — Medication 200 MCG: at 13:54

## 2025-08-16 ENCOUNTER — APPOINTMENT (OUTPATIENT)
Dept: CT IMAGING | Facility: HOSPITAL | Age: 25
End: 2025-08-16
Payer: COMMERCIAL

## 2025-08-17 ENCOUNTER — APPOINTMENT (OUTPATIENT)
Dept: CARDIOLOGY | Facility: HOSPITAL | Age: 25
End: 2025-08-17
Payer: COMMERCIAL

## 2025-08-22 ENCOUNTER — MATERNAL SCREENING (OUTPATIENT)
Dept: CALL CENTER | Facility: HOSPITAL | Age: 25
End: 2025-08-22
Payer: COMMERCIAL

## 2025-08-25 DIAGNOSIS — I42.9 CARDIOMYOPATHY, UNSPECIFIED TYPE: Primary | ICD-10-CM
